# Patient Record
Sex: FEMALE | Race: WHITE | Employment: FULL TIME | URBAN - METROPOLITAN AREA
[De-identification: names, ages, dates, MRNs, and addresses within clinical notes are randomized per-mention and may not be internally consistent; named-entity substitution may affect disease eponyms.]

---

## 2017-01-23 ENCOUNTER — OFFICE VISIT (OUTPATIENT)
Dept: FAMILY MEDICINE CLINIC | Facility: CLINIC | Age: 37
End: 2017-01-23

## 2017-01-23 VITALS
SYSTOLIC BLOOD PRESSURE: 128 MMHG | HEIGHT: 59.5 IN | HEART RATE: 86 BPM | DIASTOLIC BLOOD PRESSURE: 68 MMHG | BODY MASS INDEX: 49.93 KG/M2 | RESPIRATION RATE: 16 BRPM | WEIGHT: 251 LBS

## 2017-01-23 DIAGNOSIS — M54.41 ACUTE RIGHT-SIDED LOW BACK PAIN WITH RIGHT-SIDED SCIATICA: Primary | ICD-10-CM

## 2017-01-23 PROCEDURE — 99214 OFFICE O/P EST MOD 30 MIN: CPT | Performed by: FAMILY MEDICINE

## 2017-01-23 RX ORDER — CYCLOBENZAPRINE HCL 10 MG
10 TABLET ORAL 3 TIMES DAILY
Qty: 30 TABLET | Refills: 1 | Status: SHIPPED | OUTPATIENT
Start: 2017-01-23 | End: 2017-02-12

## 2017-01-23 RX ORDER — NABUMETONE 750 MG/1
750 TABLET, FILM COATED ORAL 2 TIMES DAILY
Qty: 50 TABLET | Refills: 1 | Status: SHIPPED | OUTPATIENT
Start: 2017-01-23 | End: 2017-05-31

## 2017-01-23 NOTE — PROGRESS NOTES
Eduarda Shaw is a 40year old female here for Patient presents with:  Low Back Pain: started 3 days ago  Diarrhea: 01/19/17-01/20/2017  Nausea      HPI:     Back pain  -started 3-4 days ago  -no precipitating factor - although was having stomach bug with Pain  - with palpable areas of spasm  - no warning signs, no neuro deficits reassuring  -continue to monitor numbness in foot - if worsening and not improving let us know  -encouraged exercise, stretching (handouts given), warm/ice packs prn  -ibuprofen pr

## 2017-01-23 NOTE — PATIENT INSTRUCTIONS
-- start nabumetone 2x/day with food for 2-3 days, then as needed  -- cyclobenzaprine up to 3x/day as needed - may cause drowsiness  -- exercises, heat/ice as needed  --  Out of work for 2 days  -- followup in 2-4 wks depending on level of improvement

## 2017-05-31 PROBLEM — E66.01 MORBID OBESITY DUE TO EXCESS CALORIES (HCC): Status: ACTIVE | Noted: 2017-05-31

## 2017-05-31 PROBLEM — R06.09 DYSPNEA ON EXERTION: Status: ACTIVE | Noted: 2017-05-31

## 2017-05-31 PROBLEM — R94.31 ABNORMAL EKG: Status: ACTIVE | Noted: 2017-05-31

## 2017-05-31 PROBLEM — Z01.810 PRE-OPERATIVE CARDIOVASCULAR EXAMINATION: Status: ACTIVE | Noted: 2017-05-31

## 2017-05-31 PROBLEM — R06.00 DYSPNEA ON EXERTION: Status: ACTIVE | Noted: 2017-05-31

## 2017-06-21 ENCOUNTER — OFFICE VISIT (OUTPATIENT)
Dept: FAMILY MEDICINE CLINIC | Facility: CLINIC | Age: 37
End: 2017-06-21

## 2017-06-21 VITALS
DIASTOLIC BLOOD PRESSURE: 70 MMHG | HEIGHT: 59.5 IN | BODY MASS INDEX: 50.33 KG/M2 | SYSTOLIC BLOOD PRESSURE: 118 MMHG | HEART RATE: 70 BPM | RESPIRATION RATE: 14 BRPM | WEIGHT: 253 LBS

## 2017-06-21 DIAGNOSIS — R60.0 BILATERAL LEG EDEMA: Primary | ICD-10-CM

## 2017-06-21 PROCEDURE — 99214 OFFICE O/P EST MOD 30 MIN: CPT | Performed by: FAMILY MEDICINE

## 2017-06-21 NOTE — PATIENT INSTRUCTIONS
-- start otc compression stockings during the day (especially when at work), take off at night  -- limit sodium  -- increase activity - more walking or gentle exercise will help mobilize fluid  -- elevate legs when sitting at home  -- if not improving by Ariana Rolle

## 2017-06-21 NOTE — PROGRESS NOTES
Sylvie Moraels is a 29year old female here for Patient presents with:  Edema: bilateral leg & feet swelling on & off x 1 year       HPI:     Edema  -start about 1 yr ago  -off and on  -worse at end of day  -affected both legs and feet  -associated with di

## 2018-01-03 ENCOUNTER — HOSPITAL ENCOUNTER (OUTPATIENT)
Dept: GENERAL RADIOLOGY | Age: 38
Discharge: HOME | End: 2018-01-03
Attending: FAMILY MEDICINE
Payer: COMMERCIAL

## 2018-01-03 ENCOUNTER — OFFICE VISIT (OUTPATIENT)
Dept: FAMILY MEDICINE CLINIC | Facility: CLINIC | Age: 38
End: 2018-01-03

## 2018-01-03 ENCOUNTER — TELEPHONE (OUTPATIENT)
Dept: FAMILY MEDICINE CLINIC | Facility: CLINIC | Age: 38
End: 2018-01-03

## 2018-01-03 VITALS
DIASTOLIC BLOOD PRESSURE: 74 MMHG | TEMPERATURE: 98 F | HEIGHT: 59.5 IN | HEART RATE: 74 BPM | RESPIRATION RATE: 16 BRPM | WEIGHT: 194 LBS | BODY MASS INDEX: 38.59 KG/M2 | SYSTOLIC BLOOD PRESSURE: 118 MMHG

## 2018-01-03 DIAGNOSIS — J06.9 ACUTE URI: ICD-10-CM

## 2018-01-03 DIAGNOSIS — J40 BRONCHITIS: ICD-10-CM

## 2018-01-03 DIAGNOSIS — J06.9 ACUTE URI: Primary | ICD-10-CM

## 2018-01-03 PROCEDURE — 99213 OFFICE O/P EST LOW 20 MIN: CPT | Performed by: FAMILY MEDICINE

## 2018-01-03 PROCEDURE — 71046 X-RAY EXAM CHEST 2 VIEWS: CPT | Performed by: FAMILY MEDICINE

## 2018-01-03 RX ORDER — AZITHROMYCIN 500 MG/1
500 TABLET, FILM COATED ORAL DAILY
Qty: 7 TABLET | Refills: 0 | Status: SHIPPED | OUTPATIENT
Start: 2018-01-03 | End: 2018-01-10

## 2018-01-03 NOTE — TELEPHONE ENCOUNTER
Called and spoke with pt. Pt states she received results from  and has no further questions at this time.

## 2018-01-03 NOTE — PROGRESS NOTES
HPI:   Vic Ro is a 40year old female who presents for upper respiratory symptoms for  2  weeks.  Patient reports sore throat, congestion, clear and yellow colored nasal discharge, low grade fever, dry cough, chest pain from coughing, sinus pain, LAT CHEST ([id number]=[id number])  INDICATIONS:  J06.9 Acute upper respiratory infection, unspecified J40 Bronchitis, not specified as acute or chronic  COMPARISON:  None. TECHNIQUE:  PA and lateral chest radiographs were obtained.   PATIENT STATED HISTORY: (As transc

## 2018-01-03 NOTE — TELEPHONE ENCOUNTER
Called patient with X Ray results per Dr. Veto Cleaning normal anti-biotic called in.  No answer lvm

## 2018-03-07 ENCOUNTER — OFFICE VISIT (OUTPATIENT)
Dept: FAMILY MEDICINE CLINIC | Facility: CLINIC | Age: 38
End: 2018-03-07

## 2018-03-07 VITALS
BODY MASS INDEX: 33.82 KG/M2 | HEIGHT: 59.5 IN | SYSTOLIC BLOOD PRESSURE: 90 MMHG | HEART RATE: 66 BPM | DIASTOLIC BLOOD PRESSURE: 68 MMHG | WEIGHT: 170 LBS

## 2018-03-07 DIAGNOSIS — R55 SYNCOPE, UNSPECIFIED SYNCOPE TYPE: ICD-10-CM

## 2018-03-07 DIAGNOSIS — E16.2 HYPOGLYCEMIA: Primary | ICD-10-CM

## 2018-03-07 DIAGNOSIS — I95.89 OTHER SPECIFIED HYPOTENSION: ICD-10-CM

## 2018-03-07 PROCEDURE — 99496 TRANSJ CARE MGMT HIGH F2F 7D: CPT | Performed by: FAMILY MEDICINE

## 2018-03-07 PROCEDURE — 1111F DSCHRG MED/CURRENT MED MERGE: CPT | Performed by: FAMILY MEDICINE

## 2018-03-07 RX ORDER — BUTALBITAL, ACETAMINOPHEN AND CAFFEINE 50; 325; 40 MG/1; MG/1; MG/1
1 TABLET ORAL EVERY 4 HOURS PRN
Refills: 0 | COMMUNITY
Start: 2018-03-06 | End: 2021-02-22

## 2018-03-07 NOTE — PATIENT INSTRUCTIONS
-- more salt in diet  --ibuprofen 600mg-800mg up to 3x/day with meals as needed for headache (do not use for prolonged period)  -- limit use of prescription to more severe pain  -- check fasting sugar and 2 h after each meal  -- followup in 1 wk for rech

## 2018-03-07 NOTE — PROGRESS NOTES
HPI:    Ricky Siu is a 40year old female here today for hospital follow up.    She was discharged from Inpatient hospital, [facility] to Home   Admit Date: 3/2/18  Discharge Date: 3/6/18  Hospital Discharge Diagnosis:    Syncope, Hypoglycemia, Hypotensi medications on file prior to visit. HISTORY: reconciled and reviewed with patient  She  has no past medical history on file. She  has no past surgical history on file. She family history includes Diabetes in her maternal grandmother;  Other in h extremities  EXTREMITIES: no cyanosis, clubbing or edema  NEURO: Oriented times three, cranial nerves are intact, motor and sensory are grossly intact    ASSESSMENT/ PLAN:   Diagnoses and all orders for this visit:    Hypoglycemia  Syncope, unspecified syn days:   · Number of Possible Diagnoses and/or Management Options: severe  · Amount and/or Complexity of Data to Be Reviewed: severe  · Risk of Significant Complications, Morbidity, and/or Mortality: severe    Overall Risk:   severe    Patient seen within 7

## 2018-03-08 ENCOUNTER — MYAURORA ACCOUNT LINK (OUTPATIENT)
Dept: OTHER | Age: 38
End: 2018-03-08

## 2018-03-08 ENCOUNTER — PRIOR ORIGINAL RECORDS (OUTPATIENT)
Dept: OTHER | Age: 38
End: 2018-03-08

## 2018-03-09 ENCOUNTER — LAB ENCOUNTER (OUTPATIENT)
Dept: LAB | Age: 38
End: 2018-03-09
Attending: INTERNAL MEDICINE
Payer: COMMERCIAL

## 2018-03-09 ENCOUNTER — PRIOR ORIGINAL RECORDS (OUTPATIENT)
Dept: OTHER | Age: 38
End: 2018-03-09

## 2018-03-09 ENCOUNTER — HOSPITAL ENCOUNTER (OUTPATIENT)
Dept: CT IMAGING | Facility: HOSPITAL | Age: 38
Discharge: HOME | End: 2018-03-09
Attending: INTERNAL MEDICINE
Payer: COMMERCIAL

## 2018-03-09 DIAGNOSIS — R55 SYNCOPE: ICD-10-CM

## 2018-03-09 DIAGNOSIS — R07.2 CHEST PAIN, PRECORDIAL: ICD-10-CM

## 2018-03-09 DIAGNOSIS — Z01.818 PREOP TESTING: Primary | ICD-10-CM

## 2018-03-09 LAB
BUN BLD-MCNC: 12 MG/DL (ref 8–20)
CALCIUM BLD-MCNC: 8.9 MG/DL (ref 8.3–10.3)
CHLORIDE: 105 MMOL/L (ref 101–111)
CO2: 27 MMOL/L (ref 22–32)
CREAT BLD-MCNC: 0.6 MG/DL (ref 0.55–1.02)
GLUCOSE BLD-MCNC: 84 MG/DL (ref 70–99)
POTASSIUM SERPL-SCNC: 3.6 MMOL/L (ref 3.6–5.1)
SODIUM SERPL-SCNC: 141 MMOL/L (ref 136–144)

## 2018-03-09 PROCEDURE — 36415 COLL VENOUS BLD VENIPUNCTURE: CPT

## 2018-03-09 PROCEDURE — 75574 CT ANGIO HRT W/3D IMAGE: CPT | Performed by: INTERNAL MEDICINE

## 2018-03-09 PROCEDURE — 80048 BASIC METABOLIC PNL TOTAL CA: CPT

## 2018-03-09 RX ORDER — NITROGLYCERIN 0.4 MG/1
0.4 TABLET SUBLINGUAL ONCE
Status: COMPLETED | OUTPATIENT
Start: 2018-03-09 | End: 2018-03-09

## 2018-03-09 RX ORDER — NITROGLYCERIN 0.4 MG/1
TABLET SUBLINGUAL
Status: COMPLETED
Start: 2018-03-09 | End: 2018-03-09

## 2018-03-09 RX ADMIN — NITROGLYCERIN 0.4 MG: 0.4 TABLET SUBLINGUAL at 14:50:00

## 2018-03-13 ENCOUNTER — PRIOR ORIGINAL RECORDS (OUTPATIENT)
Dept: OTHER | Age: 38
End: 2018-03-13

## 2018-03-23 ENCOUNTER — HOSPITAL ENCOUNTER (OUTPATIENT)
Dept: CV DIAGNOSTICS | Age: 38
Discharge: HOME | End: 2018-03-23
Attending: INTERNAL MEDICINE
Payer: COMMERCIAL

## 2018-03-23 ENCOUNTER — MYAURORA ACCOUNT LINK (OUTPATIENT)
Dept: OTHER | Age: 38
End: 2018-03-23

## 2018-03-23 DIAGNOSIS — R55 SYNCOPE, UNSPECIFIED SYNCOPE TYPE: ICD-10-CM

## 2018-04-02 ENCOUNTER — PRIOR ORIGINAL RECORDS (OUTPATIENT)
Dept: OTHER | Age: 38
End: 2018-04-02

## 2018-06-10 ENCOUNTER — CHARTING TRANS (OUTPATIENT)
Dept: OTHER | Age: 38
End: 2018-06-10

## 2018-06-10 ENCOUNTER — LAB SERVICES (OUTPATIENT)
Dept: OTHER | Age: 38
End: 2018-06-10

## 2018-06-10 LAB — RAPID STREP GROUP A: NORMAL

## 2018-06-10 ASSESSMENT — PAIN SCALES - GENERAL: PAINLEVEL_OUTOF10: 8

## 2018-11-01 VITALS — HEART RATE: 72 BPM | RESPIRATION RATE: 16 BRPM | TEMPERATURE: 98 F | WEIGHT: 144.84 LBS

## 2019-02-28 VITALS
HEART RATE: 67 BPM | SYSTOLIC BLOOD PRESSURE: 96 MMHG | WEIGHT: 167 LBS | HEIGHT: 59 IN | DIASTOLIC BLOOD PRESSURE: 60 MMHG | BODY MASS INDEX: 33.67 KG/M2

## 2019-08-23 ENCOUNTER — TELEPHONE (OUTPATIENT)
Dept: FAMILY MEDICINE CLINIC | Facility: CLINIC | Age: 39
End: 2019-08-23

## 2019-08-23 NOTE — TELEPHONE ENCOUNTER
Patient called stating her BP is running high she took her BP and it is 180/98.  Patient would like to know if she should go to the ER or what she should do since Dr. Mayur Parrish has no openings

## 2019-08-23 NOTE — TELEPHONE ENCOUNTER
Phoned patient. She states for approx 3 days she has not felt well. C/O dizziness/faintness. States she has fainted approx year ago. Blood pressure at that time  was low. She checked it with this episode and now it is very high.   Advised patient to go

## 2019-09-04 NOTE — Clinical Note
Thank you for allowing me to see your patient. She can return to breast clinic PRN, I told her to continue screening mammograms through your office. Thanks!  Robby Lund Details (Free Text): 1 Photo Preface (Leave Blank If You Do Not Want): Photographs were obtained today Detail Level: Zone

## 2020-03-04 ENCOUNTER — PATIENT OUTREACH (OUTPATIENT)
Dept: FAMILY MEDICINE CLINIC | Facility: CLINIC | Age: 40
End: 2020-03-04

## 2020-03-04 NOTE — PROGRESS NOTES
Patient is due for Wellness Visit. Mail box is full unable to leave message for patient to call office. Patient needs appointment. Letter sent.

## 2020-05-05 ENCOUNTER — VIRTUAL PHONE E/M (OUTPATIENT)
Dept: FAMILY MEDICINE CLINIC | Facility: CLINIC | Age: 40
End: 2020-05-05
Payer: COMMERCIAL

## 2020-05-05 ENCOUNTER — LAB ENCOUNTER (OUTPATIENT)
Dept: LAB | Facility: HOSPITAL | Age: 40
End: 2020-05-05
Attending: FAMILY MEDICINE
Payer: COMMERCIAL

## 2020-05-05 DIAGNOSIS — R05.9 COUGH: ICD-10-CM

## 2020-05-05 DIAGNOSIS — Z20.822 CLOSE EXPOSURE TO COVID-19 VIRUS: Primary | ICD-10-CM

## 2020-05-05 DIAGNOSIS — R09.81 SINUS CONGESTION: ICD-10-CM

## 2020-05-05 DIAGNOSIS — R50.81 FEVER IN OTHER DISEASES: ICD-10-CM

## 2020-05-05 DIAGNOSIS — Z20.822 CLOSE EXPOSURE TO COVID-19 VIRUS: ICD-10-CM

## 2020-05-05 PROCEDURE — 99214 OFFICE O/P EST MOD 30 MIN: CPT | Performed by: FAMILY MEDICINE

## 2020-05-05 RX ORDER — ALBUTEROL SULFATE 90 UG/1
2 AEROSOL, METERED RESPIRATORY (INHALATION) EVERY 6 HOURS PRN
Qty: 1 INHALER | Refills: 0 | Status: SHIPPED | OUTPATIENT
Start: 2020-05-05 | End: 2020-05-05

## 2020-05-05 RX ORDER — ALBUTEROL SULFATE 90 UG/1
AEROSOL, METERED RESPIRATORY (INHALATION)
Qty: 25.5 G | Refills: 0 | Status: SHIPPED | OUTPATIENT
Start: 2020-05-05 | End: 2021-02-22

## 2020-05-05 RX ORDER — BENZONATATE 200 MG/1
200 CAPSULE ORAL 3 TIMES DAILY PRN
Qty: 20 CAPSULE | Refills: 0 | Status: SHIPPED | OUTPATIENT
Start: 2020-05-05 | End: 2020-05-11

## 2020-05-05 RX ORDER — GUAIFENESIN AND CODEINE PHOSPHATE 100; 10 MG/5ML; MG/5ML
5 SOLUTION ORAL 3 TIMES DAILY PRN
Qty: 118 ML | Refills: 0 | Status: SHIPPED | OUTPATIENT
Start: 2020-05-05 | End: 2020-05-11

## 2020-05-05 NOTE — PROGRESS NOTES
Virtual/Telephone Check-In    Lauren AMAYA Harris Garcia is a 46year old female here today for a telemedicine/video visit. Patient understands and accepts financial responsibility for any deductible, co-insurance and/or co-pays associated with this service.     D reviewed. No pertinent past medical history. History reviewed. No pertinent surgical history.    Family History   Problem Relation Age of Onset   • Diabetes Maternal Grandmother    • Other (Other [Other]) Paternal Grandfather         Liver disease      So adequate time. This billing was spent on reviewing labs, medications, radiology tests and decision making. Appropriate medical decision-making and tests are ordered as detailed in the plan of care above.

## 2020-05-05 NOTE — PATIENT INSTRUCTIONS
Tessalon as needed for cough    Codeine syrup as needed    Albuterol inhaler as needed    We will call tomorrow for followup

## 2020-05-06 ENCOUNTER — VIRTUAL PHONE E/M (OUTPATIENT)
Dept: FAMILY MEDICINE CLINIC | Facility: CLINIC | Age: 40
End: 2020-05-06
Payer: COMMERCIAL

## 2020-05-06 DIAGNOSIS — U07.1 COVID-19: Primary | ICD-10-CM

## 2020-05-06 DIAGNOSIS — R05.9 COUGH: ICD-10-CM

## 2020-05-06 DIAGNOSIS — R50.9 FEVER, UNSPECIFIED FEVER CAUSE: ICD-10-CM

## 2020-05-06 PROCEDURE — 99214 OFFICE O/P EST MOD 30 MIN: CPT | Performed by: FAMILY MEDICINE

## 2020-05-06 NOTE — PROGRESS NOTES
Virtual/Telephone Check-In    Lauren AMAYA Moriah Cabello is a 46year old female here today for a telemedicine/video visit. Patient understands and accepts financial responsibility for any deductible, co-insurance and/or co-pays associated with this service.     D Outpatient Medications   Medication Sig Dispense Refill   • benzonatate 200 MG Oral Cap Take 1 capsule (200 mg total) by mouth 3 (three) times daily as needed for cough.  20 capsule 0   • guaiFENesin-Codeine 100-10 MG/5ML Oral Syrup Take 5 mL by mouth 3 (th

## 2020-05-07 ENCOUNTER — PATIENT OUTREACH (OUTPATIENT)
Dept: CASE MANAGEMENT | Age: 40
End: 2020-05-07

## 2020-05-07 NOTE — PROGRESS NOTES
Home Monitoring Condition Update    Covid19+ test date: 5/5/2020      Consent Verification:  Assessment Completed With: Patient  HIPAA Verified?   Yes    COVID-19 HOME MONITORING 5/7/2020   Temperature 99.9   Reading From Mouth   Pulse 68   Pulse taken fr 911.      Time spent this encounter reviewing chart, speaking with patient, gathering resources  Total Time: 15  minutes

## 2020-05-08 ENCOUNTER — VIRTUAL PHONE E/M (OUTPATIENT)
Dept: FAMILY MEDICINE CLINIC | Facility: CLINIC | Age: 40
End: 2020-05-08
Payer: COMMERCIAL

## 2020-05-08 ENCOUNTER — PATIENT OUTREACH (OUTPATIENT)
Dept: CASE MANAGEMENT | Age: 40
End: 2020-05-08

## 2020-05-08 ENCOUNTER — TELEPHONE (OUTPATIENT)
Dept: CASE MANAGEMENT | Age: 40
End: 2020-05-08

## 2020-05-08 ENCOUNTER — HOSPITAL ENCOUNTER (EMERGENCY)
Facility: HOSPITAL | Age: 40
Discharge: HOME | End: 2020-05-08
Attending: EMERGENCY MEDICINE
Payer: COMMERCIAL

## 2020-05-08 ENCOUNTER — APPOINTMENT (OUTPATIENT)
Dept: GENERAL RADIOLOGY | Facility: HOSPITAL | Age: 40
End: 2020-05-08
Attending: EMERGENCY MEDICINE
Payer: COMMERCIAL

## 2020-05-08 VITALS
SYSTOLIC BLOOD PRESSURE: 110 MMHG | WEIGHT: 120 LBS | DIASTOLIC BLOOD PRESSURE: 72 MMHG | TEMPERATURE: 98 F | BODY MASS INDEX: 24.19 KG/M2 | HEIGHT: 59 IN | RESPIRATION RATE: 13 BRPM | HEART RATE: 73 BPM | OXYGEN SATURATION: 99 %

## 2020-05-08 DIAGNOSIS — U07.1 COVID-19: Primary | ICD-10-CM

## 2020-05-08 PROCEDURE — 99453 REM MNTR PHYSIOL PARAM SETUP: CPT

## 2020-05-08 PROCEDURE — 85379 FIBRIN DEGRADATION QUANT: CPT | Performed by: PHYSICIAN ASSISTANT

## 2020-05-08 PROCEDURE — 85025 COMPLETE CBC W/AUTO DIFF WBC: CPT | Performed by: EMERGENCY MEDICINE

## 2020-05-08 PROCEDURE — 99285 EMERGENCY DEPT VISIT HI MDM: CPT

## 2020-05-08 PROCEDURE — 99284 EMERGENCY DEPT VISIT MOD MDM: CPT

## 2020-05-08 PROCEDURE — 71045 X-RAY EXAM CHEST 1 VIEW: CPT | Performed by: EMERGENCY MEDICINE

## 2020-05-08 PROCEDURE — 93005 ELECTROCARDIOGRAM TRACING: CPT

## 2020-05-08 PROCEDURE — 93010 ELECTROCARDIOGRAM REPORT: CPT

## 2020-05-08 PROCEDURE — 80053 COMPREHEN METABOLIC PANEL: CPT | Performed by: EMERGENCY MEDICINE

## 2020-05-08 PROCEDURE — 84484 ASSAY OF TROPONIN QUANT: CPT | Performed by: EMERGENCY MEDICINE

## 2020-05-08 RX ORDER — ACETAMINOPHEN 500 MG
1000 TABLET ORAL EVERY 4 HOURS PRN
COMMUNITY
End: 2021-07-08 | Stop reason: ALTCHOICE

## 2020-05-08 NOTE — PROGRESS NOTES
Patient called - no answer - reviewed notes - patient symptoms worse - sent to ER - will monitor closely

## 2020-05-08 NOTE — TELEPHONE ENCOUNTER
Called patient for Day 2 COVID monitoring  Patient feeling worse today. T: 98.4 orally with last dose of tylenol at 0900 this morning  P: 80 manually  Patient coughing - persistent, dry unproductive.    Patient does have some SOB during coughing fit as we

## 2020-05-08 NOTE — CM/SW NOTE
IS device orientation completed the below steps were then reviewed. Step 1. Exhale normally. Then, inhale normally. Relax and breathe out. Step 2. Place your lips tightly around the mouthpiece. Make sure the device is upright and not tilted.   Si

## 2020-05-08 NOTE — PROGRESS NOTES
Home Monitoring Condition Update    Covid19+ test date: 5/5/20  Contact date: 5/8/20      Consent Verification:  Assessment Completed With: Patient  HIPAA Verified?   Yes    COVID-19 HOME MONITORING 5/8/2020   Temperature 98.4   Reading From Mouth   Pulse shoulder/Jaw. Denies current dizziness. Patient had a dose of tylenol this morning at 0900. Message sent to pcp to address. Patient advised to check temperature and pulse twice daily and record.  Patient also informed we will call tomorrow to get a conditi

## 2020-05-08 NOTE — ED PROVIDER NOTES
Patient Seen in: [facility] Emergency Department      History   Patient presents with:  Chest Pain Angina    Stated Complaint:     HPI    44-year-old female who comes in today complaining of worsening chest pain that is sharp and shortness of breath or tonsillar hypertrophy, uvula midline, no trismus or drooling   Neck: Supple; no anterior or posterior cervical adenopathy  Lungs: Clear to auscultation bilaterally, respirations unlabored. No wheezing, rales or rhonchi. Heart: NSR, S1, S2 present.  No on 5/08/2020 at 1:48 PM     Finalized by: Paco Hodge MD on 5/08/2020 at 1:48 PM                MDM     Discussed with and evaluated the patient with Dr. Elie Cuevas who agrees with assessment and plan.     Since chest x-ray is negative, laboratory work patient verbalized understanding of the discharge instructions and plan.

## 2020-05-09 ENCOUNTER — PATIENT OUTREACH (OUTPATIENT)
Dept: CASE MANAGEMENT | Age: 40
End: 2020-05-09

## 2020-05-09 NOTE — PROGRESS NOTES
Home Monitoring Condition Update    Covid19+ test date: 5/5/20  Contact date: 5/9/20      Consent Verification:  Assessment Completed With: Patient  HIPAA Verified?   Yes    COVID-19 HOME MONITORING 5/9/2020   Temperature 98.8   Reading From Mouth   SPO2 to exceed 3 grams per day. Patient verbalizes understanding. Patient went to ER yesterday for SOB, cough. Patient feeling much better today. No coughing while on call.  Patient was given a pulse oximeter and using a nebulizer machine PRN for cough, SOB, wh

## 2020-05-11 ENCOUNTER — VIRTUAL PHONE E/M (OUTPATIENT)
Dept: FAMILY MEDICINE CLINIC | Facility: CLINIC | Age: 40
End: 2020-05-11
Payer: COMMERCIAL

## 2020-05-11 ENCOUNTER — PATIENT OUTREACH (OUTPATIENT)
Dept: CASE MANAGEMENT | Age: 40
End: 2020-05-11

## 2020-05-11 DIAGNOSIS — R05.9 COUGH: ICD-10-CM

## 2020-05-11 DIAGNOSIS — R50.9 FEVER, UNSPECIFIED FEVER CAUSE: ICD-10-CM

## 2020-05-11 DIAGNOSIS — U07.1 COVID-19: Primary | ICD-10-CM

## 2020-05-11 PROCEDURE — 99214 OFFICE O/P EST MOD 30 MIN: CPT | Performed by: FAMILY MEDICINE

## 2020-05-11 RX ORDER — GUAIFENESIN AND CODEINE PHOSPHATE 100; 10 MG/5ML; MG/5ML
SOLUTION ORAL 3 TIMES DAILY PRN
Qty: 118 ML | Refills: 0 | Status: SHIPPED | OUTPATIENT
Start: 2020-05-11 | End: 2020-05-19

## 2020-05-11 RX ORDER — BENZONATATE 200 MG/1
200 CAPSULE ORAL 3 TIMES DAILY PRN
Qty: 20 CAPSULE | Refills: 0 | Status: SHIPPED | OUTPATIENT
Start: 2020-05-11 | End: 2020-05-19

## 2020-05-11 NOTE — PROGRESS NOTES
Home Monitoring Condition Update    Covid19+ test date: 5/5/2020      Consent Verification:  Assessment Completed With: Patient  HIPAA Verified?   Yes    COVID-19 HOME MONITORING 5/11/2020   Temperature 98.9   Reading From Mouth   SPO2 96   Pulse 64   Pul call for update again tomorrow. Patient voices understanding/agrees with plan/no further questions.      The patient was directed to continue to isolate away from other household members when possible and stay completely isolated from the general public 3 d

## 2020-05-11 NOTE — PROGRESS NOTES
Virtual/Telephone Check-In    Lauren AMAYA Shlomo Choi is a 46year old female here today for a telemedicine/video visit. Patient understands and accepts financial responsibility for any deductible, co-insurance and/or co-pays associated with this service.     D Family History   Problem Relation Age of Onset   • Diabetes Maternal Grandmother    • Other (Other [Other]) Paternal Grandfather         Liver disease      Social History: Social History    Tobacco Use      Smoking status: Never Smoker      Smokeless tob performed. Every conscious effort was taken to allow for sufficient and adequate time. This billing was spent on reviewing labs, medications, radiology tests and decision making.   Appropriate medical decision-making and tests are ordered as detailed in t

## 2020-05-12 ENCOUNTER — PATIENT OUTREACH (OUTPATIENT)
Dept: CASE MANAGEMENT | Age: 40
End: 2020-05-12

## 2020-05-12 NOTE — PROGRESS NOTES
Home Monitoring Condition Update    Covid19+ test date:   5/5/2020      Consent Verification:  Assessment Completed With: Patient  HIPAA Verified?   Yes    COVID-19 HOME MONITORING 5/12/2020   Temperature 98.3   Reading From Mouth   SPO2 92   Pulse 72   P contacting him/her tomorrow. She has been using her spirometer and we discussed cleaning the mouth piece.       The patient was directed to continue to isolate away from other household members when possible and stay completely isolated from the general p

## 2020-05-13 ENCOUNTER — VIRTUAL PHONE E/M (OUTPATIENT)
Dept: FAMILY MEDICINE CLINIC | Facility: CLINIC | Age: 40
End: 2020-05-13
Payer: COMMERCIAL

## 2020-05-13 ENCOUNTER — PATIENT OUTREACH (OUTPATIENT)
Dept: CASE MANAGEMENT | Age: 40
End: 2020-05-13

## 2020-05-13 DIAGNOSIS — U07.1 COVID-19: Primary | ICD-10-CM

## 2020-05-13 DIAGNOSIS — R50.9 FEVER, UNSPECIFIED FEVER CAUSE: ICD-10-CM

## 2020-05-13 DIAGNOSIS — R05.9 COUGH: ICD-10-CM

## 2020-05-13 PROCEDURE — 99213 OFFICE O/P EST LOW 20 MIN: CPT | Performed by: FAMILY MEDICINE

## 2020-05-13 NOTE — PROGRESS NOTES
Virtual/Telephone Check-In    Lauren AMAYA Benuel Crigler is a 46year old female here today for a telemedicine/video visit. Patient understands and accepts financial responsibility for any deductible, co-insurance and/or co-pays associated with this service.     D use: No      Alcohol/week: 0.0 standard drinks    Drug use: No         Medications (Active prior to today's visit):  Current Outpatient Medications   Medication Sig Dispense Refill   • benzonatate 200 MG Oral Cap Take 1 capsule (200 mg total) by mouth 3 (t

## 2020-05-13 NOTE — PROGRESS NOTES
Home Monitoring Condition Update    Covid19+ test date: 5/5/2020      Consent Verification:  Assessment Completed With: Patient  HIPAA Verified?   Yes    COVID-19 HOME MONITORING 5/13/2020   Temperature 98   Reading From -   SPO2 98   Pulse 62   Pulse charity tomorrow. Patient voices understanding/agrees with plan/no further questions.      The patient was directed to continue to isolate away from other household members when possible and stay completely isolated from the general public 3 days after symptoms res

## 2020-05-14 ENCOUNTER — PATIENT OUTREACH (OUTPATIENT)
Dept: CASE MANAGEMENT | Age: 40
End: 2020-05-14

## 2020-05-14 NOTE — PROGRESS NOTES
LMOMTCB for home monitoring day 7 condition update. NCM contact information provided. - per VV yesterday PCP would like us to continue home monitoring.

## 2020-05-15 ENCOUNTER — TELEPHONE (OUTPATIENT)
Dept: CASE MANAGEMENT | Age: 40
End: 2020-05-15

## 2020-05-15 NOTE — TELEPHONE ENCOUNTER
Called patient for day 7 condition update    Left calf pain started last night. Denies warmth, redness, and swelling where pain is located. Denies SOB. Patient denies injury to the area. Describes pain as \"arnoldo horse\" but pain does not resolve.   T: 99

## 2020-05-15 NOTE — TELEPHONE ENCOUNTER
Would have her monitor closely, tylenol prn    If severe worsening of pain develops over weekend, or gets more swollen, red, and warm - would have her go to ER for further evaluation (as mentioned)    Otherwise, she can call office next week if not [place]

## 2020-05-15 NOTE — TELEPHONE ENCOUNTER
Patient advised and verbalized understanding. Patient denies swelling, denies redness, denies warmth to area.

## 2020-05-15 NOTE — PROGRESS NOTES
Home Monitoring Condition Update    Covid19+ test date: 5/5/20  Contact date: 5/15/20      Consent Verification:  Assessment Completed With: Patient  HIPAA Verified?   Yes    COVID-19 HOME MONITORING 5/15/2020   Temperature 99.6   Reading From Mouth   SPO Patient denies injury to the area. Describes pain as \"arnoldo horse\" but pain did not resolve. Patient advised go to ER for eval if any of the above symptoms develop. TE sent to PCP to notify and see if intervention needed.   Patient advised to inform the

## 2020-05-16 ENCOUNTER — PATIENT OUTREACH (OUTPATIENT)
Dept: CASE MANAGEMENT | Age: 40
End: 2020-05-16

## 2020-05-18 ENCOUNTER — PATIENT OUTREACH (OUTPATIENT)
Dept: CASE MANAGEMENT | Age: 40
End: 2020-05-18

## 2020-05-18 DIAGNOSIS — R05.9 COUGH: ICD-10-CM

## 2020-05-18 DIAGNOSIS — Z20.822 CLOSE EXPOSURE TO COVID-19 VIRUS: ICD-10-CM

## 2020-05-18 RX ORDER — BENZONATATE 200 MG/1
200 CAPSULE ORAL 3 TIMES DAILY PRN
Qty: 20 CAPSULE | Refills: 0 | OUTPATIENT
Start: 2020-05-18

## 2020-05-18 RX ORDER — GUAIFENESIN AND CODEINE PHOSPHATE 100; 10 MG/5ML; MG/5ML
SOLUTION ORAL 3 TIMES DAILY PRN
Qty: 118 ML | Refills: 0 | OUTPATIENT
Start: 2020-05-18 | End: 2020-06-01

## 2020-05-18 RX ORDER — ALBUTEROL SULFATE 90 UG/1
2 AEROSOL, METERED RESPIRATORY (INHALATION) EVERY 6 HOURS PRN
Qty: 25.5 G | Refills: 0 | OUTPATIENT
Start: 2020-05-18

## 2020-05-18 NOTE — TELEPHONE ENCOUNTER
Refills denied as they were for acute therapy.  Patient to follow up if still having concerning symptoms that need treatment

## 2020-05-18 NOTE — PROGRESS NOTES
Home Monitoring Condition Update    Covid19+ test date: 5/5/20  Contact date: 5/18/20      Consent Verification:  Assessment Completed With: Patient  HIPAA Verified?   Yes    COVID-19 HOME MONITORING 5/18/2020   Temperature 98.3   Reading From Mouth   SPO with symptoms. Patient states she was suppose to return to work Wednesday but does feel like she can because of the cough. Follow up visit scheduled with patient's PCP. Patient also has complaints of constant headache.  Feels better after taking tylenol and

## 2020-05-19 ENCOUNTER — VIRTUAL PHONE E/M (OUTPATIENT)
Dept: FAMILY MEDICINE CLINIC | Facility: CLINIC | Age: 40
End: 2020-05-19
Payer: COMMERCIAL

## 2020-05-19 DIAGNOSIS — U07.1 COVID-19: Primary | ICD-10-CM

## 2020-05-19 DIAGNOSIS — R50.9 FEVER, UNSPECIFIED FEVER CAUSE: ICD-10-CM

## 2020-05-19 DIAGNOSIS — R05.9 COUGH: ICD-10-CM

## 2020-05-19 PROCEDURE — 99213 OFFICE O/P EST LOW 20 MIN: CPT | Performed by: FAMILY MEDICINE

## 2020-05-19 RX ORDER — GUAIFENESIN AND CODEINE PHOSPHATE 100; 10 MG/5ML; MG/5ML
SOLUTION ORAL 3 TIMES DAILY PRN
Qty: 118 ML | Refills: 0 | Status: SHIPPED | OUTPATIENT
Start: 2020-05-19 | End: 2020-06-02

## 2020-05-19 RX ORDER — BENZONATATE 200 MG/1
200 CAPSULE ORAL 3 TIMES DAILY PRN
Qty: 20 CAPSULE | Refills: 0 | Status: SHIPPED | OUTPATIENT
Start: 2020-05-19 | End: 2021-02-22

## 2020-05-19 NOTE — PROGRESS NOTES
Virtual/Telephone Check-In    Lauren AMAYA Stanford Cain is a 46year old female here today for a telemedicine/video visit. Patient understands and accepts financial responsibility for any deductible, co-insurance and/or co-pays associated with this service.     D History: Social History    Tobacco Use      Smoking status: Never Smoker      Smokeless tobacco: Never Used    Alcohol use: No      Alcohol/week: 0.0 standard drinks    Drug use: No         Medications (Active prior to today's visit):  Current Outpatient M decision making. Appropriate medical decision-making and tests are ordered as detailed in the plan of care above.

## 2020-05-20 ENCOUNTER — TELEPHONE (OUTPATIENT)
Dept: CASE MANAGEMENT | Age: 40
End: 2020-05-20

## 2020-05-20 ENCOUNTER — PATIENT OUTREACH (OUTPATIENT)
Dept: CASE MANAGEMENT | Age: 40
End: 2020-05-20

## 2020-05-20 NOTE — TELEPHONE ENCOUNTER
Patient had COVID follow up visit 5/19  Please advise if you would like to continue [facility] home monitoring program or okay to discharge. Please respond to [address] monitoring pool.     Thank you

## 2020-05-20 NOTE — PROGRESS NOTES
Patient had VV on 5/19.   Per pcp okay to stop home monitoring program:    Shukri MirandasicianSigned  2:14 PM                Ok to discontinue - thanks for your help

## 2020-05-26 ENCOUNTER — PATIENT MESSAGE (OUTPATIENT)
Dept: FAMILY MEDICINE CLINIC | Facility: CLINIC | Age: 40
End: 2020-05-26

## 2020-05-26 NOTE — TELEPHONE ENCOUNTER
From: Gomez Thayer  To: Tierra Lo MD  Sent: 5/26/2020 11:13 AM CDT  Subject: Other    Good Morning Dr. Jeffrey Simon     I was wondering if you would be able to provide a note to go back to the office, I am feeling better and no longer have the cough.

## 2020-07-02 ENCOUNTER — CLINICAL ABSTRACT (OUTPATIENT)
Dept: OBGYN | Age: 40
End: 2020-07-02

## 2020-07-03 ENCOUNTER — OFFICE VISIT (OUTPATIENT)
Dept: OBGYN | Age: 40
End: 2020-07-03

## 2020-07-03 ENCOUNTER — LAB SERVICES (OUTPATIENT)
Dept: LAB | Age: 40
End: 2020-07-03

## 2020-07-03 VITALS
SYSTOLIC BLOOD PRESSURE: 107 MMHG | WEIGHT: 135.36 LBS | BODY MASS INDEX: 27.29 KG/M2 | RESPIRATION RATE: 20 BRPM | DIASTOLIC BLOOD PRESSURE: 72 MMHG | HEART RATE: 68 BPM | HEIGHT: 59 IN | TEMPERATURE: 97.4 F

## 2020-07-03 DIAGNOSIS — N89.8 VAGINAL DISCHARGE: ICD-10-CM

## 2020-07-03 DIAGNOSIS — Z11.51 SCREENING FOR HPV (HUMAN PAPILLOMAVIRUS): ICD-10-CM

## 2020-07-03 DIAGNOSIS — N92.6 MENSTRUAL CHANGES: ICD-10-CM

## 2020-07-03 DIAGNOSIS — Z12.4 SCREENING FOR MALIGNANT NEOPLASM OF CERVIX: ICD-10-CM

## 2020-07-03 DIAGNOSIS — Z71.85 HPV VACCINE COUNSELING: ICD-10-CM

## 2020-07-03 DIAGNOSIS — Z01.419 WOMEN'S ANNUAL ROUTINE GYNECOLOGICAL EXAMINATION: Primary | ICD-10-CM

## 2020-07-03 DIAGNOSIS — Z30.431 IUD CHECK UP: ICD-10-CM

## 2020-07-03 LAB — TSH SERPL-ACNC: 2.32 MCUNITS/ML (ref 0.35–5)

## 2020-07-03 PROCEDURE — 36415 COLL VENOUS BLD VENIPUNCTURE: CPT | Performed by: NURSE PRACTITIONER

## 2020-07-03 PROCEDURE — 84443 ASSAY THYROID STIM HORMONE: CPT | Performed by: NURSE PRACTITIONER

## 2020-07-03 PROCEDURE — 87591 N.GONORRHOEAE DNA AMP PROB: CPT | Performed by: NURSE PRACTITIONER

## 2020-07-03 PROCEDURE — 99202 OFFICE O/P NEW SF 15 MIN: CPT | Performed by: NURSE PRACTITIONER

## 2020-07-03 PROCEDURE — 87480 CANDIDA DNA DIR PROBE: CPT | Performed by: NURSE PRACTITIONER

## 2020-07-03 PROCEDURE — 87491 CHLMYD TRACH DNA AMP PROBE: CPT | Performed by: NURSE PRACTITIONER

## 2020-07-03 PROCEDURE — 87510 GARDNER VAG DNA DIR PROBE: CPT | Performed by: NURSE PRACTITIONER

## 2020-07-03 PROCEDURE — 87660 TRICHOMONAS VAGIN DIR PROBE: CPT | Performed by: NURSE PRACTITIONER

## 2020-07-03 PROCEDURE — 99385 PREV VISIT NEW AGE 18-39: CPT | Performed by: NURSE PRACTITIONER

## 2020-07-03 SDOH — HEALTH STABILITY: MENTAL HEALTH: HOW OFTEN DO YOU HAVE 6 OR MORE DRINKS ON ONE OCCASION?: NEVER

## 2020-07-03 SDOH — SOCIAL STABILITY: SOCIAL NETWORK: ARE YOU MARRIED, WIDOWED, DIVORCED, SEPARATED, NEVER MARRIED, OR LIVING WITH A PARTNER?: PATIENT DECLINED

## 2020-07-03 SDOH — SOCIAL STABILITY: SOCIAL INSECURITY: WITHIN THE LAST YEAR, HAVE YOU BEEN AFRAID OF YOUR PARTNER OR EX-PARTNER?: NO

## 2020-07-03 SDOH — SOCIAL STABILITY: SOCIAL NETWORK: HOW OFTEN DO YOU ATTEND CHURCH OR RELIGIOUS SERVICES?: PATIENT DECLINED

## 2020-07-03 SDOH — HEALTH STABILITY: PHYSICAL HEALTH: ON AVERAGE, HOW MANY DAYS PER WEEK DO YOU ENGAGE IN MODERATE TO STRENUOUS EXERCISE (LIKE A BRISK WALK)?: 3 DAYS

## 2020-07-03 SDOH — HEALTH STABILITY: PHYSICAL HEALTH: ON AVERAGE, HOW MANY MINUTES DO YOU ENGAGE IN EXERCISE AT THIS LEVEL?: 60 MIN

## 2020-07-03 SDOH — HEALTH STABILITY: MENTAL HEALTH
STRESS IS WHEN SOMEONE FEELS TENSE, NERVOUS, ANXIOUS, OR CAN'T SLEEP AT NIGHT BECAUSE THEIR MIND IS TROUBLED. HOW STRESSED ARE YOU?: TO SOME EXTENT

## 2020-07-03 SDOH — ECONOMIC STABILITY: TRANSPORTATION INSECURITY
IN THE PAST 12 MONTHS, HAS LACK OF TRANSPORTATION KEPT YOU FROM MEETINGS, WORK, OR FROM GETTING THINGS NEEDED FOR DAILY LIVING?: NO

## 2020-07-03 SDOH — ECONOMIC STABILITY: TRANSPORTATION INSECURITY
IN THE PAST 12 MONTHS, HAS THE LACK OF TRANSPORTATION KEPT YOU FROM MEDICAL APPOINTMENTS OR FROM GETTING MEDICATIONS?: NO

## 2020-07-03 SDOH — SOCIAL STABILITY: SOCIAL INSECURITY
WITHIN THE LAST YEAR, HAVE YOU BEEN KICKED, HIT, SLAPPED, OR OTHERWISE PHYSICALLY HURT BY YOUR PARTNER OR EX-PARTNER?: NO

## 2020-07-03 SDOH — SOCIAL STABILITY: SOCIAL NETWORK: HOW OFTEN DO YOU GET TOGETHER WITH FRIENDS OR RELATIVES?: PATIENT DECLINED

## 2020-07-03 SDOH — SOCIAL STABILITY: SOCIAL NETWORK
DO YOU BELONG TO ANY CLUBS OR ORGANIZATIONS SUCH AS CHURCH GROUPS UNIONS, FRATERNAL OR ATHLETIC GROUPS, OR SCHOOL GROUPS?: PATIENT DECLINED

## 2020-07-03 SDOH — SOCIAL STABILITY: SOCIAL NETWORK: HOW OFTEN DO YOU ATTENT MEETINGS OF THE CLUB OR ORGANIZATION YOU BELONG TO?: PATIENT DECLINED

## 2020-07-03 SDOH — HEALTH STABILITY: MENTAL HEALTH: HOW MANY STANDARD DRINKS CONTAINING ALCOHOL DO YOU HAVE ON A TYPICAL DAY?: 1 OR 2

## 2020-07-03 SDOH — ECONOMIC STABILITY: INCOME INSECURITY: HOW HARD IS IT FOR YOU TO PAY FOR THE VERY BASICS LIKE FOOD, HOUSING, MEDICAL CARE, AND HEATING?: NOT HARD AT ALL

## 2020-07-03 SDOH — ECONOMIC STABILITY: FOOD INSECURITY: WITHIN THE PAST 12 MONTHS, THE FOOD YOU BOUGHT JUST DIDN'T LAST AND YOU DIDN'T HAVE MONEY TO GET MORE.: NEVER TRUE

## 2020-07-03 SDOH — SOCIAL STABILITY: SOCIAL INSECURITY: WITHIN THE LAST YEAR, HAVE YOU BEEN HUMILIATED OR EMOTIONALLY ABUSED IN OTHER WAYS BY YOUR PARTNER OR EX-PARTNER?: NO

## 2020-07-03 SDOH — SOCIAL STABILITY: SOCIAL NETWORK: IN A TYPICAL WEEK, HOW MANY TIMES DO YOU TALK ON THE PHONE WITH FAMILY, FRIENDS, OR NEIGHBORS?: PATIENT DECLINED

## 2020-07-03 SDOH — SOCIAL STABILITY: SOCIAL INSECURITY
WITHIN THE LAST YEAR, HAVE TO BEEN RAPED OR FORCED TO HAVE ANY KIND OF SEXUAL ACTIVITY BY YOUR PARTNER OR EX-PARTNER?: NO

## 2020-07-03 SDOH — HEALTH STABILITY: MENTAL HEALTH: HOW OFTEN DO YOU HAVE A DRINK CONTAINING ALCOHOL?: MONTHLY OR LESS

## 2020-07-03 SDOH — ECONOMIC STABILITY: FOOD INSECURITY: WITHIN THE PAST 12 MONTHS, YOU WORRIED THAT YOUR FOOD WOULD RUN OUT BEFORE YOU GOT MONEY TO BUY MORE.: NEVER TRUE

## 2020-07-03 ASSESSMENT — PATIENT HEALTH QUESTIONNAIRE - PHQ9
SUM OF ALL RESPONSES TO PHQ9 QUESTIONS 1 AND 2: 0
SUM OF ALL RESPONSES TO PHQ9 QUESTIONS 1 AND 2: 0
2. FEELING DOWN, DEPRESSED OR HOPELESS: NOT AT ALL
CLINICAL INTERPRETATION OF PHQ2 SCORE: NO FURTHER SCREENING NEEDED
CLINICAL INTERPRETATION OF PHQ9 SCORE: NO FURTHER SCREENING NEEDED
1. LITTLE INTEREST OR PLEASURE IN DOING THINGS: NOT AT ALL

## 2020-07-04 LAB
C TRACH RRNA SPEC QL NAA+PROBE: NEGATIVE
N GONORRHOEA RRNA SPEC QL NAA+PROBE: NEGATIVE
SPECIMEN SOURCE: NORMAL

## 2020-07-05 LAB
CANDIDA RRNA VAG QL PROBE: NEGATIVE
G VAGINALIS RRNA GENITAL QL PROBE: POSITIVE
T VAGINALIS RRNA GENITAL QL PROBE: NEGATIVE

## 2020-07-07 DIAGNOSIS — N76.0 BV (BACTERIAL VAGINOSIS): Primary | ICD-10-CM

## 2020-07-07 DIAGNOSIS — B96.89 BV (BACTERIAL VAGINOSIS): Primary | ICD-10-CM

## 2020-07-07 RX ORDER — METRONIDAZOLE 500 MG/1
500 TABLET ORAL 2 TIMES DAILY
Qty: 14 TABLET | Refills: 0 | Status: SHIPPED | OUTPATIENT
Start: 2020-07-07 | End: 2020-07-14

## 2020-07-07 RX ORDER — FLUCONAZOLE 150 MG/1
150 TABLET ORAL ONCE
Qty: 1 TABLET | Refills: 0 | Status: SHIPPED | OUTPATIENT
Start: 2020-07-07 | End: 2020-07-07

## 2020-07-16 LAB — HPV16+18+45 E6+E7MRNA CVX NAA+PROBE: NORMAL

## 2020-09-30 ENCOUNTER — TELEPHONE (OUTPATIENT)
Dept: OBGYN | Age: 40
End: 2020-09-30

## 2020-11-03 ENCOUNTER — OFFICE VISIT (OUTPATIENT)
Dept: OBGYN | Age: 40
End: 2020-11-03

## 2020-11-03 VITALS
BODY MASS INDEX: 28.3 KG/M2 | HEART RATE: 69 BPM | WEIGHT: 140.1 LBS | DIASTOLIC BLOOD PRESSURE: 71 MMHG | TEMPERATURE: 98 F | RESPIRATION RATE: 16 BRPM | SYSTOLIC BLOOD PRESSURE: 114 MMHG

## 2020-11-03 DIAGNOSIS — R87.615 UNSATISFACTORY CYTOLOGY OF CERVICAL PAPANICOLAOU SMEAR: Primary | ICD-10-CM

## 2020-11-03 PROCEDURE — 99212 OFFICE O/P EST SF 10 MIN: CPT | Performed by: NURSE PRACTITIONER

## 2020-11-03 SDOH — HEALTH STABILITY: MENTAL HEALTH: HOW OFTEN DO YOU HAVE 6 OR MORE DRINKS ON ONE OCCASION?: NEVER

## 2020-11-03 SDOH — HEALTH STABILITY: MENTAL HEALTH: HOW MANY STANDARD DRINKS CONTAINING ALCOHOL DO YOU HAVE ON A TYPICAL DAY?: 1 OR 2

## 2020-11-03 SDOH — HEALTH STABILITY: MENTAL HEALTH: HOW OFTEN DO YOU HAVE A DRINK CONTAINING ALCOHOL?: MONTHLY OR LESS

## 2020-11-03 ASSESSMENT — PATIENT HEALTH QUESTIONNAIRE - PHQ9
SUM OF ALL RESPONSES TO PHQ9 QUESTIONS 1 AND 2: 0
SUM OF ALL RESPONSES TO PHQ9 QUESTIONS 1 AND 2: 0
2. FEELING DOWN, DEPRESSED OR HOPELESS: NOT AT ALL
1. LITTLE INTEREST OR PLEASURE IN DOING THINGS: NOT AT ALL
CLINICAL INTERPRETATION OF PHQ2 SCORE: NO FURTHER SCREENING NEEDED
CLINICAL INTERPRETATION OF PHQ9 SCORE: NO FURTHER SCREENING NEEDED

## 2020-11-06 LAB — PATH REPORT, THINPREP: NORMAL

## 2020-11-13 ENCOUNTER — TELEPHONE (OUTPATIENT)
Dept: OBGYN | Age: 40
End: 2020-11-13

## 2020-11-13 RX ORDER — METRONIDAZOLE 500 MG/1
500 TABLET ORAL 2 TIMES DAILY
Qty: 14 TABLET | Refills: 0 | Status: SHIPPED | OUTPATIENT
Start: 2020-11-13 | End: 2020-11-20

## 2020-12-09 ENCOUNTER — TELEPHONE (OUTPATIENT)
Dept: FAMILY MEDICINE CLINIC | Facility: CLINIC | Age: 40
End: 2020-12-09

## 2020-12-09 NOTE — TELEPHONE ENCOUNTER
Patient reports headache since Sunday, has taken Tylenol with minimal relief. Was also having sinus pressure and took otc sinus medicine.      Blood pressure 173/87, HR 69 today at 9:00am    Patient reports blurry vision and mild dizziness started today thi

## 2020-12-09 NOTE — TELEPHONE ENCOUNTER
Pt called and said she has been having a headache since yesterday and her B/P pressure is 147/88 this morning. She also has blurred vision. She is set up for tomorrow for a Doximity visit.

## 2020-12-09 NOTE — TELEPHONE ENCOUNTER
If symptoms worsen, she should go to immediate care    Otherwise, if symptoms improve - we will followup tomorrow as planned

## 2020-12-10 ENCOUNTER — TELEMEDICINE (OUTPATIENT)
Dept: FAMILY MEDICINE CLINIC | Facility: CLINIC | Age: 40
End: 2020-12-10
Payer: COMMERCIAL

## 2020-12-10 DIAGNOSIS — G44.89 OTHER HEADACHE SYNDROME: Primary | ICD-10-CM

## 2020-12-10 DIAGNOSIS — H53.8 BLURRED VISION: ICD-10-CM

## 2020-12-10 PROCEDURE — 99214 OFFICE O/P EST MOD 30 MIN: CPT | Performed by: FAMILY MEDICINE

## 2020-12-10 RX ORDER — FLUTICASONE PROPIONATE 50 MCG
2 SPRAY, SUSPENSION (ML) NASAL DAILY
Qty: 1 BOTTLE | Refills: 2 | Status: SHIPPED | OUTPATIENT
Start: 2020-12-10 | End: 2021-02-22

## 2020-12-10 NOTE — PATIENT INSTRUCTIONS
-start flonase and anti-histamine daily  -c/w tylenol as needed  -expectant management  -see eye doctor if blurry vision comes back  -go to ER if vision significantly changes  -otherwise, touch base with us if symptoms not improving next week with above

## 2020-12-10 NOTE — PROGRESS NOTES
Virtual/Telephone Check-In    Oli Ruvalcaba is a 45year old female here today for a telemedicine audio and video visit. HPI:       1. Other headache syndrome  2.  Blurred vision  -has been having more headaches since her covid infection 6 months ag Used    Alcohol use: No      Alcohol/week: 0.0 standard drinks    Drug use: No         Medications (Active prior to today's visit):  Current Outpatient Medications   Medication Sig Dispense Refill   • Fluticasone Propionate 50 MCG/ACT Nasal Suspension 2 sp telehealth visit, including treatment limitations as no physical exam could be performed. The patient was advised to call 911 or to go to the ER in case there was an emergency.   The patient was also advised of the potential privacy & security concerns rel

## 2021-02-22 ENCOUNTER — APPOINTMENT (OUTPATIENT)
Dept: CT IMAGING | Age: 41
End: 2021-02-22
Attending: EMERGENCY MEDICINE
Payer: COMMERCIAL

## 2021-02-22 ENCOUNTER — HOSPITAL ENCOUNTER (EMERGENCY)
Age: 41
Discharge: HOME | End: 2021-02-22
Attending: EMERGENCY MEDICINE
Payer: COMMERCIAL

## 2021-02-22 ENCOUNTER — APPOINTMENT (OUTPATIENT)
Dept: GENERAL RADIOLOGY | Age: 41
End: 2021-02-22
Attending: EMERGENCY MEDICINE
Payer: COMMERCIAL

## 2021-02-22 VITALS
RESPIRATION RATE: 12 BRPM | HEART RATE: 70 BPM | HEIGHT: 59 IN | BODY MASS INDEX: 26.21 KG/M2 | SYSTOLIC BLOOD PRESSURE: 118 MMHG | DIASTOLIC BLOOD PRESSURE: 54 MMHG | OXYGEN SATURATION: 98 % | WEIGHT: 130 LBS | TEMPERATURE: 97 F

## 2021-02-22 DIAGNOSIS — S09.90XA CLOSED HEAD INJURY, INITIAL ENCOUNTER: Primary | ICD-10-CM

## 2021-02-22 DIAGNOSIS — S63.502A SPRAIN OF LEFT WRIST, INITIAL ENCOUNTER: ICD-10-CM

## 2021-02-22 PROCEDURE — 99284 EMERGENCY DEPT VISIT MOD MDM: CPT

## 2021-02-22 PROCEDURE — 73110 X-RAY EXAM OF WRIST: CPT | Performed by: EMERGENCY MEDICINE

## 2021-02-22 PROCEDURE — 70450 CT HEAD/BRAIN W/O DYE: CPT | Performed by: EMERGENCY MEDICINE

## 2021-02-22 PROCEDURE — 29125 APPL SHORT ARM SPLINT STATIC: CPT

## 2021-02-22 RX ORDER — ACETAMINOPHEN 500 MG
1000 TABLET ORAL ONCE
Status: COMPLETED | OUTPATIENT
Start: 2021-02-22 | End: 2021-02-22

## 2021-02-22 NOTE — ED PROVIDER NOTES
Patient Seen in: THE [facility] Emergency Department In [place]      History   Patient presents with:  Fall    Stated Complaint: slipped on ice- pain to l wrist, head,back pain    HPI/Subjective:   HPI    Patient is a 41-year-old woman who slipped and fell on the scaphoid. No guarding or rebound. ED Course   Labs Reviewed - No data to display       Slip and fall with closed head injury, lower back pain, left wrist pain. Will check head CT given the worsening headache.   Will check x-rays of the left wrist,

## 2021-02-22 NOTE — ED INITIAL ASSESSMENT (HPI)
Slipped on ice today and hit back of head on ground, denies loc.   Reports left wrist pain and low back pain

## 2021-03-15 ENCOUNTER — TELEMEDICINE (OUTPATIENT)
Dept: FAMILY MEDICINE CLINIC | Facility: CLINIC | Age: 41
End: 2021-03-15
Payer: COMMERCIAL

## 2021-03-15 DIAGNOSIS — H00.014 HORDEOLUM EXTERNUM OF LEFT UPPER EYELID: Primary | ICD-10-CM

## 2021-03-15 PROCEDURE — 99214 OFFICE O/P EST MOD 30 MIN: CPT | Performed by: FAMILY MEDICINE

## 2021-03-15 RX ORDER — ERYTHROMYCIN 5 MG/G
OINTMENT OPHTHALMIC
Qty: 1 G | Refills: 0 | Status: SHIPPED | OUTPATIENT
Start: 2021-03-15 | End: 2021-07-08 | Stop reason: ALTCHOICE

## 2021-03-15 NOTE — PROGRESS NOTES
Virtual/Telephone Check-In    Med Posadas is a 45year old female here today for a telemedicine audio and video visit. HPI:       1.  Hordeolum externum of left upper eyelid  -started several days ago  -has been using warm compress with minimal re Ascorbic Acid (VITAMIN C OR) Take by mouth. • Multiple Vitamin (MULTI-VITAMIN) Oral Tab Take 1 tablet by mouth daily. • acetaminophen 500 MG Oral Tab Take 1,000 mg by mouth every 4 (four) hours as needed for Pain.  Last dose 0900         Allergies: medications, radiology tests and decision making. Appropriate medical decision-making and tests are ordered as detailed in the plan of care above.   Coding/billing information is submitted for this visit based on complexity of care and/or time spent for the

## 2021-07-06 ENCOUNTER — TELEPHONE (OUTPATIENT)
Dept: FAMILY MEDICINE CLINIC | Facility: CLINIC | Age: 41
End: 2021-07-06

## 2021-07-06 NOTE — TELEPHONE ENCOUNTER
Spoke to patient. States she is not \"feeling herself\". Tearful on the phone. Denies suicidal ideations. States has a foggy brain and cant concentrate. Offered appointments but she declined because they are not within a couple days.   Offered behavior

## 2021-07-08 NOTE — PATIENT INSTRUCTIONS
Take off the 19th and followup with me at 2:30pm to see how you're doing    Plan to start escitalopram 1/2 tab daily x 4 days, then 1 tab daily (can continue 1/2 tab daily if you need more time to adjust)    Hydroxyzine as needed for severe anxiety    If

## 2021-07-12 NOTE — PROGRESS NOTES
Clearance Hien is a 37year old female here for Patient presents with:  Depression: Getting worse over the last 3 weeks      HPI:       1. Current moderate episode of major depressive disorder without prior episode ([address]  2.  Anxiety  -notes significant w daily.          Allergies:    Shellfish-Derived P*    HIVES  Latex                   RASH      ROS:   See HPI for relevant ROS    --GEN: No other complaints  --HEENT: No other complaints  --RESP: No other complaints  --CV: No other complaints  --GI: No othe Referrals:  [facility] 2D+3D SCREENING BILAT (CPT=[id number])     Chaparrita Lisa MD    I spent a total of 40 minutes, more than half of which was spent counseling/coordinating care regarding depression

## 2021-07-21 ENCOUNTER — PATIENT MESSAGE (OUTPATIENT)
Dept: FAMILY MEDICINE CLINIC | Facility: CLINIC | Age: 41
End: 2021-07-21

## 2021-07-22 NOTE — TELEPHONE ENCOUNTER
From: Marjorie Martinez  To: Clem Fu MD  Sent: 7/21/2021 6:01 PM CDT  Subject: Visit Follow-up Question    Sebastián Bernstein  - I was unable to make our appt as I had extended my stay and was in unable to call the office due to service.  I'm back and am sti

## 2021-07-23 NOTE — TELEPHONE ENCOUNTER
From: Maxwell Oliveira  To: Carol Valentino MD  Sent: 7/22/2021 10:22 PM CDT  Subject: Visit Follow-up Question    Hi Dr Manya Mcardle- I ended up being taken from work to the ER, I am home now they did multiple tests but in the end they gave me something for vert

## 2021-07-26 NOTE — TELEPHONE ENCOUNTER
Have her increase lexapro to 2 tabs (20mg) daily until her appt with me    Referral and meclizine and note signed    Thanks

## 2021-07-26 NOTE — TELEPHONE ENCOUNTER
Patient walked in today thinking she had an appointment per previous [employer] message. Reports her anxiety and panic attacks are getting worse. She is still having on and off dizziness and headaches.  Was diagnosed with vertigo in the ED and given [facility]

## 2021-07-26 NOTE — TELEPHONE ENCOUNTER
Relayed recommendations to patient. She VU and agreed with plan. She confirmed appointment for Thursday.      Medication list updated

## 2021-07-27 ENCOUNTER — TELEPHONE (OUTPATIENT)
Dept: FAMILY MEDICINE CLINIC | Facility: CLINIC | Age: 41
End: 2021-07-27

## 2021-07-27 NOTE — TELEPHONE ENCOUNTER
Received Disability paperwork via fax, pt has appt on 07/29/2021 for anxiety and panic attacks, called pt and left a message to see what the disability is for, gave paperwork to [address] [place] [address] Elie1 C/Connor Murphy.

## 2021-07-29 ENCOUNTER — TELEPHONE (OUTPATIENT)
Dept: FAMILY MEDICINE CLINIC | Facility: CLINIC | Age: 41
End: 2021-07-29

## 2021-07-29 NOTE — PROGRESS NOTES
Marjorie Martinez is a 37year old female here for Patient presents with:  Depression  Anxiety: During work on Thursday of last week patient had a panic attack, Patient was taken to [facility], here for follow up. HPI:       1. Anxiety  2.  Panic a • Multiple Vitamin (MULTI-VITAMIN) Oral Tab Take 1 tablet by mouth daily.          Allergies:    Shellfish-Derived P*    HIVES  Adhesive Tape (Geeta*    RASH  Latex                   RASH      ROS:   See HPI for relevant ROS    --GEN: No other complaints

## 2021-08-12 ENCOUNTER — HOSPITAL ENCOUNTER (OUTPATIENT)
Dept: MAMMOGRAPHY | Age: 41
Discharge: HOME | End: 2021-08-12
Attending: FAMILY MEDICINE
Payer: COMMERCIAL

## 2021-08-12 DIAGNOSIS — Z12.31 ENCOUNTER FOR SCREENING MAMMOGRAM FOR MALIGNANT NEOPLASM OF BREAST: ICD-10-CM

## 2021-08-12 PROCEDURE — 77067 SCR MAMMO BI INCL CAD: CPT | Performed by: FAMILY MEDICINE

## 2021-08-12 PROCEDURE — 77063 BREAST TOMOSYNTHESIS BI: CPT | Performed by: FAMILY MEDICINE

## 2021-08-16 ENCOUNTER — HOSPITAL ENCOUNTER (OUTPATIENT)
Dept: MAMMOGRAPHY | Age: 41
Discharge: HOME | End: 2021-08-16
Attending: FAMILY MEDICINE
Payer: COMMERCIAL

## 2021-08-16 ENCOUNTER — HOSPITAL ENCOUNTER (OUTPATIENT)
Dept: ULTRASOUND IMAGING | Age: 41
Discharge: HOME | End: 2021-08-16
Attending: FAMILY MEDICINE
Payer: COMMERCIAL

## 2021-08-16 DIAGNOSIS — R92.2 INCONCLUSIVE MAMMOGRAM: ICD-10-CM

## 2021-08-16 PROCEDURE — 76642 ULTRASOUND BREAST LIMITED: CPT | Performed by: FAMILY MEDICINE

## 2021-08-16 PROCEDURE — 77062 BREAST TOMOSYNTHESIS BI: CPT | Performed by: FAMILY MEDICINE

## 2021-08-16 PROCEDURE — 77066 DX MAMMO INCL CAD BI: CPT | Performed by: FAMILY MEDICINE

## 2021-08-18 ENCOUNTER — OFFICE VISIT (OUTPATIENT)
Dept: FAMILY MEDICINE CLINIC | Facility: CLINIC | Age: 41
End: 2021-08-18
Payer: COMMERCIAL

## 2021-08-18 VITALS
OXYGEN SATURATION: 99 % | HEIGHT: 58 IN | DIASTOLIC BLOOD PRESSURE: 70 MMHG | SYSTOLIC BLOOD PRESSURE: 102 MMHG | BODY MASS INDEX: 32.12 KG/M2 | TEMPERATURE: 97 F | HEART RATE: 66 BPM | WEIGHT: 153 LBS

## 2021-08-18 DIAGNOSIS — Z23 NEED FOR VACCINATION: ICD-10-CM

## 2021-08-18 DIAGNOSIS — F41.9 ANXIETY: ICD-10-CM

## 2021-08-18 DIAGNOSIS — F41.0 PANIC ATTACKS: ICD-10-CM

## 2021-08-18 DIAGNOSIS — F32.1 CURRENT MODERATE EPISODE OF MAJOR DEPRESSIVE DISORDER WITHOUT PRIOR EPISODE (HCC): ICD-10-CM

## 2021-08-18 DIAGNOSIS — Z00.00 ROUTINE GENERAL MEDICAL EXAMINATION AT A HEALTH CARE FACILITY: Primary | ICD-10-CM

## 2021-08-18 PROCEDURE — 90715 TDAP VACCINE 7 YRS/> IM: CPT | Performed by: FAMILY MEDICINE

## 2021-08-18 PROCEDURE — 3074F SYST BP LT 130 MM HG: CPT | Performed by: FAMILY MEDICINE

## 2021-08-18 PROCEDURE — 90471 IMMUNIZATION ADMIN: CPT | Performed by: FAMILY MEDICINE

## 2021-08-18 PROCEDURE — 99214 OFFICE O/P EST MOD 30 MIN: CPT | Performed by: FAMILY MEDICINE

## 2021-08-18 PROCEDURE — 3078F DIAST BP <80 MM HG: CPT | Performed by: FAMILY MEDICINE

## 2021-08-18 PROCEDURE — 99396 PREV VISIT EST AGE 40-64: CPT | Performed by: FAMILY MEDICINE

## 2021-08-18 PROCEDURE — 3008F BODY MASS INDEX DOCD: CPT | Performed by: FAMILY MEDICINE

## 2021-08-18 RX ORDER — TRAZODONE HYDROCHLORIDE 50 MG/1
TABLET ORAL NIGHTLY PRN
Qty: 30 TABLET | Refills: 1 | Status: SHIPPED | OUTPATIENT
Start: 2021-08-18 | End: 2021-11-18

## 2021-08-18 NOTE — PATIENT INSTRUCTIONS
Go to [facility] for fasting bloodwork    Try trazodone as needed for sleep    Continue all other meds    Continue to work on diet and exericise    Continue with therapy    Followup in 6 wks    Touch base sooner about return to work date after your meeting you have joint pains with walking/jogging/running    For sleep:  -make sure room is dark and quiet  -no reading, tv, phone, tablets, computers in bed - these can activate the brain over time and associate being awake with being in the bed  -if you are not

## 2021-08-18 NOTE — PROGRESS NOTES
Coty Horn is a 37year old female who is here for Patient presents with:  Wellness Visit  Sleep Problem: Still not sleeping good. patient is sleeping an average of  5 hours. Anxiety: Follow up      HPI:     1.  Routine general medical examination Yes      Wt Readings from Last 6 Encounters:  08/18/21 : 153 lb (69.4 kg)  07/29/21 : 149 lb (67.6 kg)  07/08/21 : 150 lb (68 kg)  03/01/21 : 130 lb (59 kg)  02/22/21 : 130 lb (59 kg)  05/08/20 : 120 lb (54.4 kg)      Patient Active Problem List:     Pre-o used    Alcohol use: Yes      Comment: 1-2 drinks monthly    Drug use: No          EXAM:   /70   Pulse 66   Temp 97.4 °F (36.3 °C) (Other)   Ht 4' 10\" (1.473 m)   Wt 153 lb (69.4 kg)   LMP 08/18/2021   SpO2 99%   BMI 31.98 kg/m²     GENERAL: A&O, in TdaP (Boostrix/Adacel) Vaccine (> 7 Y)      Meds This Visit:  Requested Prescriptions     Signed Prescriptions Disp Refills   • traZODone 50 MG Oral Tab 30 tablet 1     Sig: Take 0.5-1 tablets (25-50 mg total) by mouth nightly as needed for Sleep.        Im

## 2021-08-23 ENCOUNTER — TELEPHONE (OUTPATIENT)
Dept: FAMILY MEDICINE CLINIC | Facility: CLINIC | Age: 41
End: 2021-08-23

## 2021-08-23 NOTE — TELEPHONE ENCOUNTER
LOV 8/18/2021  2. Current moderate episode of major depressive disorder without prior episode ([facility] [address]  3. Anxiety  4.  Panic attacks  -better  -c/w lexapro 10  -c/w counseling  -trial of trazodone prn sleep  -call us after work meeting to decide on return to

## 2021-08-24 NOTE — TELEPHONE ENCOUNTER
Paperwork completed and signed and faxed. Original to scan. Copy in triage. Left detailed message on VM of patient.

## 2021-10-05 ENCOUNTER — OFFICE VISIT (OUTPATIENT)
Dept: FAMILY MEDICINE CLINIC | Facility: CLINIC | Age: 41
End: 2021-10-05
Payer: COMMERCIAL

## 2021-10-05 VITALS
DIASTOLIC BLOOD PRESSURE: 66 MMHG | WEIGHT: 163 LBS | OXYGEN SATURATION: 98 % | SYSTOLIC BLOOD PRESSURE: 100 MMHG | BODY MASS INDEX: 34.22 KG/M2 | HEART RATE: 72 BPM | TEMPERATURE: 97 F | HEIGHT: 58 IN

## 2021-10-05 DIAGNOSIS — F32.1 CURRENT MODERATE EPISODE OF MAJOR DEPRESSIVE DISORDER WITHOUT PRIOR EPISODE (HCC): ICD-10-CM

## 2021-10-05 DIAGNOSIS — Z23 NEED FOR VACCINATION: Primary | ICD-10-CM

## 2021-10-05 DIAGNOSIS — F41.9 ANXIETY: ICD-10-CM

## 2021-10-05 DIAGNOSIS — F41.0 PANIC ATTACKS: ICD-10-CM

## 2021-10-05 PROCEDURE — 3008F BODY MASS INDEX DOCD: CPT | Performed by: FAMILY MEDICINE

## 2021-10-05 PROCEDURE — 3078F DIAST BP <80 MM HG: CPT | Performed by: FAMILY MEDICINE

## 2021-10-05 PROCEDURE — 90471 IMMUNIZATION ADMIN: CPT | Performed by: FAMILY MEDICINE

## 2021-10-05 PROCEDURE — 3074F SYST BP LT 130 MM HG: CPT | Performed by: FAMILY MEDICINE

## 2021-10-05 PROCEDURE — 90686 IIV4 VACC NO PRSV 0.5 ML IM: CPT | Performed by: FAMILY MEDICINE

## 2021-10-05 PROCEDURE — 99214 OFFICE O/P EST MOD 30 MIN: CPT | Performed by: FAMILY MEDICINE

## 2021-10-05 RX ORDER — BUSPIRONE HYDROCHLORIDE 7.5 MG/1
7.5 TABLET ORAL 2 TIMES DAILY
Qty: 60 TABLET | Refills: 2 | Status: SHIPPED | OUTPATIENT
Start: 2021-10-05 | End: 2021-10-05

## 2021-10-05 RX ORDER — ESCITALOPRAM OXALATE 20 MG/1
20 TABLET ORAL DAILY
Qty: 90 TABLET | Refills: 1 | Status: SHIPPED | OUTPATIENT
Start: 2021-10-05 | End: 2021-10-19

## 2021-10-05 NOTE — PATIENT INSTRUCTIONS
Increase escitalopram to 20mg daily    If no significant improvement in 2-4 wks, let me know on mychart    Make sure pharmacy has cancelled prescription for buspirone for now - we will consider it if needed in future    Continue trazodone nightly    Cont

## 2021-10-19 ENCOUNTER — APPOINTMENT (OUTPATIENT)
Dept: MRI IMAGING | Age: 41
End: 2021-10-19
Attending: EMERGENCY MEDICINE
Payer: COMMERCIAL

## 2021-10-19 ENCOUNTER — HOSPITAL ENCOUNTER (EMERGENCY)
Age: 41
Discharge: HOME | End: 2021-10-19
Attending: EMERGENCY MEDICINE
Payer: COMMERCIAL

## 2021-10-19 VITALS
TEMPERATURE: 97 F | RESPIRATION RATE: 20 BRPM | OXYGEN SATURATION: 100 % | HEIGHT: 59 IN | DIASTOLIC BLOOD PRESSURE: 47 MMHG | HEART RATE: 73 BPM | WEIGHT: 150 LBS | BODY MASS INDEX: 30.24 KG/M2 | SYSTOLIC BLOOD PRESSURE: 105 MMHG

## 2021-10-19 DIAGNOSIS — R20.2 FACIAL PARESTHESIA: ICD-10-CM

## 2021-10-19 DIAGNOSIS — R42 DIZZINESS: Primary | ICD-10-CM

## 2021-10-19 PROCEDURE — 99284 EMERGENCY DEPT VISIT MOD MDM: CPT

## 2021-10-19 PROCEDURE — 80053 COMPREHEN METABOLIC PANEL: CPT | Performed by: EMERGENCY MEDICINE

## 2021-10-19 PROCEDURE — 81025 URINE PREGNANCY TEST: CPT

## 2021-10-19 PROCEDURE — 85025 COMPLETE CBC W/AUTO DIFF WBC: CPT | Performed by: EMERGENCY MEDICINE

## 2021-10-19 PROCEDURE — 93010 ELECTROCARDIOGRAM REPORT: CPT

## 2021-10-19 PROCEDURE — 87086 URINE CULTURE/COLONY COUNT: CPT | Performed by: EMERGENCY MEDICINE

## 2021-10-19 PROCEDURE — 70551 MRI BRAIN STEM W/O DYE: CPT | Performed by: EMERGENCY MEDICINE

## 2021-10-19 PROCEDURE — 93005 ELECTROCARDIOGRAM TRACING: CPT

## 2021-10-19 PROCEDURE — 81001 URINALYSIS AUTO W/SCOPE: CPT | Performed by: EMERGENCY MEDICINE

## 2021-10-19 PROCEDURE — 84484 ASSAY OF TROPONIN QUANT: CPT | Performed by: EMERGENCY MEDICINE

## 2021-10-19 PROCEDURE — 99285 EMERGENCY DEPT VISIT HI MDM: CPT

## 2021-10-19 PROCEDURE — 36415 COLL VENOUS BLD VENIPUNCTURE: CPT

## 2021-11-01 NOTE — PROGRESS NOTES
Aryan Blakely is a 37year old female here for Patient presents with: Anxiety: Anxiety feels better but patient has her days. HPI:       1. Anxiety  2. Panic attacks  3.  Current moderate episode of major depressive disorder without prior episode ( are negative    PHYSICAL EXAM:   /66   Pulse 72   Temp 97.1 °F (36.2 °C) (Other)   Ht 4' 10\" (1.473 m)   Wt 163 lb (73.9 kg)   LMP 09/29/2021   SpO2 98%   Breastfeeding No   BMI 34.07 kg/m²     Gen: NAD  HEENT: NCAT, pupils equal and round  Pulm: CT

## 2021-11-16 ENCOUNTER — TELEPHONE (OUTPATIENT)
Dept: OBGYN | Age: 41
End: 2021-11-16

## 2021-11-18 PROBLEM — F33.1 MAJOR DEPRESSIVE DISORDER, RECURRENT, MODERATE (HCC): Status: ACTIVE | Noted: 2021-11-18

## 2021-11-18 PROBLEM — F41.9 ANXIETY: Status: ACTIVE | Noted: 2021-11-18

## 2021-11-18 NOTE — PROGRESS NOTES
Javad Campo is a 37year old female here for Patient presents with: Anxiety      HPI:       1. Anxiety  2. Panic attacks  3.  Current moderate episode of major depressive disorder without prior episode ([facility] [address]  -doing better  -trazodone helping sleep an 4' 11\" (1.499 m)   Wt 160 lb (72.6 kg)   LMP 10/19/2021   SpO2 98%   BMI 32.32 kg/m²     Gen: NAD  HEENT: NCAT, pupils equal and round  Psych: normal affect     ASSESSMENT/PLAN:     1. Anxiety  2. Panic attacks  3.  Current moderate episode of major depres

## 2021-11-18 NOTE — PATIENT INSTRUCTIONS
Continue all meds as prescribed    Continue to look into other job opportunities    Think about what's best for you and your family    Followup in 3 months

## 2021-11-26 ENCOUNTER — OFFICE VISIT (OUTPATIENT)
Dept: OBGYN | Age: 41
End: 2021-11-26

## 2021-11-26 ENCOUNTER — LAB SERVICES (OUTPATIENT)
Dept: LAB | Age: 41
End: 2021-11-26

## 2021-11-26 VITALS
HEIGHT: 59 IN | BODY MASS INDEX: 33.4 KG/M2 | SYSTOLIC BLOOD PRESSURE: 103 MMHG | OXYGEN SATURATION: 95 % | RESPIRATION RATE: 16 BRPM | DIASTOLIC BLOOD PRESSURE: 64 MMHG | WEIGHT: 165.68 LBS | HEART RATE: 70 BPM

## 2021-11-26 DIAGNOSIS — N92.1 METRORRHAGIA: Primary | ICD-10-CM

## 2021-11-26 DIAGNOSIS — R10.2 PELVIC PAIN: ICD-10-CM

## 2021-11-26 DIAGNOSIS — N39.3 STRESS INCONTINENCE OF URINE: ICD-10-CM

## 2021-11-26 DIAGNOSIS — N92.1 METRORRHAGIA: ICD-10-CM

## 2021-11-26 LAB
APPEARANCE, POC: CLEAR
BASOPHILS # BLD: 0 K/MCL (ref 0–0.3)
BASOPHILS NFR BLD: 0 %
BILIRUBIN, POC: NEGATIVE
COLOR, POC: YELLOW
DEPRECATED RDW RBC: 44.2 FL (ref 39–50)
EOSINOPHIL # BLD: 0 K/MCL (ref 0–0.5)
EOSINOPHIL NFR BLD: 1 %
ERYTHROCYTE [DISTWIDTH] IN BLOOD: 12.5 % (ref 11–15)
GLUCOSE UR-MCNC: NEGATIVE MG/DL
HCT VFR BLD CALC: 39.3 % (ref 36–46.5)
HGB BLD-MCNC: 12.8 G/DL (ref 12–15.5)
IMM GRANULOCYTES # BLD AUTO: 0 K/MCL (ref 0–0.2)
IMM GRANULOCYTES # BLD: 0 %
KETONES, POC: NEGATIVE MG/DL
LYMPHOCYTES # BLD: 1.7 K/MCL (ref 1–4.8)
LYMPHOCYTES NFR BLD: 34 %
MCH RBC QN AUTO: 31.4 PG (ref 26–34)
MCHC RBC AUTO-ENTMCNC: 32.6 G/DL (ref 32–36.5)
MCV RBC AUTO: 96.3 FL (ref 78–100)
MONOCYTES # BLD: 0.5 K/MCL (ref 0.3–0.9)
MONOCYTES NFR BLD: 10 %
NEUTROPHILS # BLD: 2.7 K/MCL (ref 1.8–7.7)
NEUTROPHILS NFR BLD: 55 %
NITRITE, POC: NEGATIVE
NRBC BLD MANUAL-RTO: 0 /100 WBC
OCCULT BLOOD, POC: NEGATIVE
PH UR: 8.5 [PH] (ref 5–7)
PLATELET # BLD AUTO: 275 K/MCL (ref 140–450)
PROLACTIN SERPL-MCNC: 8.6 NG/ML (ref 2.8–29.2)
PROT UR-MCNC: ABNORMAL MG/DL
RBC # BLD: 4.08 MIL/MCL (ref 4–5.2)
SP GR UR: 1.01 (ref 1–1.03)
TSH SERPL-ACNC: 1.63 MCUNITS/ML (ref 0.35–5)
UROBILINOGEN UR-MCNC: 0.2 MG/DL (ref 0–1)
WBC # BLD: 5 K/MCL (ref 4.2–11)
WBC (LEUKOCYTE) ESTERASE, POC: NEGATIVE

## 2021-11-26 PROCEDURE — 84146 ASSAY OF PROLACTIN: CPT | Performed by: PSYCHIATRY & NEUROLOGY

## 2021-11-26 PROCEDURE — 87491 CHLMYD TRACH DNA AMP PROBE: CPT | Performed by: PSYCHIATRY & NEUROLOGY

## 2021-11-26 PROCEDURE — 87591 N.GONORRHOEAE DNA AMP PROB: CPT | Performed by: PSYCHIATRY & NEUROLOGY

## 2021-11-26 PROCEDURE — 81003 URINALYSIS AUTO W/O SCOPE: CPT | Performed by: OBSTETRICS & GYNECOLOGY

## 2021-11-26 PROCEDURE — 84443 ASSAY THYROID STIM HORMONE: CPT | Performed by: PSYCHIATRY & NEUROLOGY

## 2021-11-26 PROCEDURE — 99214 OFFICE O/P EST MOD 30 MIN: CPT | Performed by: OBSTETRICS & GYNECOLOGY

## 2021-11-26 PROCEDURE — 36415 COLL VENOUS BLD VENIPUNCTURE: CPT | Performed by: PSYCHIATRY & NEUROLOGY

## 2021-11-26 PROCEDURE — 85025 COMPLETE CBC W/AUTO DIFF WBC: CPT | Performed by: PSYCHIATRY & NEUROLOGY

## 2021-11-26 RX ORDER — HYDROXYZINE HYDROCHLORIDE 25 MG/1
25 TABLET, FILM COATED ORAL PRN
COMMUNITY
Start: 2021-11-18

## 2021-11-26 RX ORDER — TRAZODONE HYDROCHLORIDE 50 MG/1
50 TABLET ORAL NIGHTLY
COMMUNITY
Start: 2021-11-18

## 2021-11-26 ASSESSMENT — PAIN SCALES - GENERAL: PAINLEVEL: 0

## 2021-11-27 ENCOUNTER — IMAGING SERVICES (OUTPATIENT)
Dept: ULTRASOUND IMAGING | Age: 41
End: 2021-11-27
Attending: OBSTETRICS & GYNECOLOGY

## 2021-11-27 DIAGNOSIS — R10.2 PELVIC PAIN: ICD-10-CM

## 2021-11-27 DIAGNOSIS — N92.1 METRORRHAGIA: ICD-10-CM

## 2021-11-27 PROCEDURE — 76856 US EXAM PELVIC COMPLETE: CPT | Performed by: RADIOLOGY

## 2021-11-27 PROCEDURE — 76830 TRANSVAGINAL US NON-OB: CPT | Performed by: RADIOLOGY

## 2021-11-29 LAB
C TRACH RRNA SPEC QL NAA+PROBE: NEGATIVE
Lab: NORMAL
N GONORRHOEA RRNA SPEC QL NAA+PROBE: NEGATIVE

## 2021-12-02 ENCOUNTER — OFFICE VISIT (OUTPATIENT)
Dept: OBGYN | Age: 41
End: 2021-12-02

## 2021-12-02 ENCOUNTER — DOCUMENTATION (OUTPATIENT)
Dept: OBGYN | Age: 41
End: 2021-12-02

## 2021-12-02 ENCOUNTER — TELEPHONE (OUTPATIENT)
Dept: OBGYN | Age: 41
End: 2021-12-02

## 2021-12-02 VITALS
WEIGHT: 160.72 LBS | OXYGEN SATURATION: 99 % | BODY MASS INDEX: 32.46 KG/M2 | HEART RATE: 80 BPM | DIASTOLIC BLOOD PRESSURE: 77 MMHG | RESPIRATION RATE: 16 BRPM | SYSTOLIC BLOOD PRESSURE: 124 MMHG | TEMPERATURE: 97.8 F

## 2021-12-02 DIAGNOSIS — R10.2 PELVIC PAIN IN FEMALE: Primary | ICD-10-CM

## 2021-12-02 DIAGNOSIS — Z01.812 ENCOUNTER FOR PREPROCEDURAL LABORATORY EXAMINATION: ICD-10-CM

## 2021-12-02 DIAGNOSIS — N92.1 METRORRHAGIA: ICD-10-CM

## 2021-12-02 PROCEDURE — 99214 OFFICE O/P EST MOD 30 MIN: CPT | Performed by: OBSTETRICS & GYNECOLOGY

## 2021-12-02 SDOH — HEALTH STABILITY: MENTAL HEALTH: HOW MANY STANDARD DRINKS CONTAINING ALCOHOL DO YOU HAVE ON A TYPICAL DAY?: 0,1 OR 2

## 2021-12-02 SDOH — HEALTH STABILITY: MENTAL HEALTH
STRESS IS WHEN SOMEONE FEELS TENSE, NERVOUS, ANXIOUS, OR CAN'T SLEEP AT NIGHT BECAUSE THEIR MIND IS TROUBLED. HOW STRESSED ARE YOU?: VERY MUCH

## 2021-12-02 SDOH — HEALTH STABILITY: PHYSICAL HEALTH: ON AVERAGE, HOW MANY MINUTES DO YOU ENGAGE IN EXERCISE AT THIS LEVEL?: 30 MIN

## 2021-12-02 SDOH — HEALTH STABILITY: PHYSICAL HEALTH: ON AVERAGE, HOW MANY DAYS PER WEEK DO YOU ENGAGE IN MODERATE TO STRENUOUS EXERCISE (LIKE A BRISK WALK)?: 2 DAYS

## 2021-12-02 SDOH — HEALTH STABILITY: MENTAL HEALTH: AUDIT TOTAL SCORE: 1

## 2021-12-02 SDOH — HEALTH STABILITY: MENTAL HEALTH: HOW OFTEN DO YOU HAVE A DRINK CONTAINING ALCOHOL?: MONTHLY OR LESS

## 2021-12-02 SDOH — HEALTH STABILITY: MENTAL HEALTH: HOW OFTEN DO YOU HAVE 6 OR MORE DRINKS ON ONE OCCASION?: NEVER

## 2021-12-02 ASSESSMENT — PATIENT HEALTH QUESTIONNAIRE - PHQ9
SUM OF ALL RESPONSES TO PHQ9 QUESTIONS 1 AND 2: 0
SUM OF ALL RESPONSES TO PHQ9 QUESTIONS 1 AND 2: 0
2. FEELING DOWN, DEPRESSED OR HOPELESS: NOT AT ALL
CLINICAL INTERPRETATION OF PHQ2 SCORE: NO FURTHER SCREENING NEEDED
1. LITTLE INTEREST OR PLEASURE IN DOING THINGS: NOT AT ALL

## 2021-12-02 ASSESSMENT — PAIN SCALES - GENERAL: PAINLEVEL: 0

## 2021-12-15 ENCOUNTER — DOCUMENTATION (OUTPATIENT)
Dept: OBGYN | Age: 41
End: 2021-12-15

## 2021-12-18 ENCOUNTER — LAB SERVICES (OUTPATIENT)
Dept: LAB | Age: 41
End: 2021-12-18

## 2021-12-18 DIAGNOSIS — N92.1 METRORRHAGIA: ICD-10-CM

## 2021-12-18 DIAGNOSIS — Z01.812 PRE-PROCEDURAL LABORATORY EXAMINATION: ICD-10-CM

## 2021-12-18 LAB
ALBUMIN SERPL-MCNC: 4.3 G/DL (ref 3.6–5.1)
ALBUMIN/GLOB SERPL: 1.4 {RATIO} (ref 1–2.4)
ALP SERPL-CCNC: 57 UNITS/L (ref 45–117)
ALT SERPL-CCNC: 20 UNITS/L
ANION GAP SERPL CALC-SCNC: 8 MMOL/L (ref 10–20)
AST SERPL-CCNC: 15 UNITS/L
BILIRUB SERPL-MCNC: 0.6 MG/DL (ref 0.2–1)
BUN SERPL-MCNC: 10 MG/DL (ref 6–20)
BUN/CREAT SERPL: 17 (ref 7–25)
CALCIUM SERPL-MCNC: 9.2 MG/DL (ref 8.4–10.2)
CHLORIDE SERPL-SCNC: 110 MMOL/L (ref 98–107)
CO2 SERPL-SCNC: 28 MMOL/L (ref 21–32)
CREAT SERPL-MCNC: 0.6 MG/DL (ref 0.51–0.95)
FASTING DURATION TIME PATIENT: 8 HOURS (ref 0–999)
GFR SERPLBLD BASED ON 1.73 SQ M-ARVRAT: >90 ML/MIN
GLOBULIN SER-MCNC: 3 G/DL (ref 2–4)
GLUCOSE SERPL-MCNC: 74 MG/DL (ref 70–99)
POTASSIUM SERPL-SCNC: 4.3 MMOL/L (ref 3.4–5.1)
PROT SERPL-MCNC: 7.3 G/DL (ref 6.4–8.2)
SODIUM SERPL-SCNC: 142 MMOL/L (ref 135–145)

## 2021-12-18 PROCEDURE — 80053 COMPREHEN METABOLIC PANEL: CPT | Performed by: PSYCHIATRY & NEUROLOGY

## 2021-12-18 PROCEDURE — 36415 COLL VENOUS BLD VENIPUNCTURE: CPT | Performed by: PSYCHIATRY & NEUROLOGY

## 2021-12-27 ENCOUNTER — LAB SERVICES (OUTPATIENT)
Dept: LAB | Age: 41
End: 2021-12-27

## 2021-12-27 DIAGNOSIS — N92.1 METRORRHAGIA: ICD-10-CM

## 2021-12-27 DIAGNOSIS — Z01.812 PRE-PROCEDURAL LABORATORY EXAMINATION: ICD-10-CM

## 2021-12-27 DIAGNOSIS — Z01.812 PRE-PROCEDURAL LABORATORY EXAMINATION: Primary | ICD-10-CM

## 2021-12-27 DIAGNOSIS — Z01.812 PRE-PROCEDURE LAB EXAM: Primary | ICD-10-CM

## 2021-12-27 LAB
ABO + RH BLD: NORMAL
BLD GP AB SCN SERPL QL GEL: NEGATIVE
SARS-COV-2 RNA RESP QL NAA+PROBE: NOT DETECTED
SERVICE CMNT-IMP: NORMAL
SERVICE CMNT-IMP: NORMAL
TYPE AND SCREEN EXPIRATION DATE: NORMAL

## 2021-12-27 PROCEDURE — 86900 BLOOD TYPING SEROLOGIC ABO: CPT | Performed by: PSYCHIATRY & NEUROLOGY

## 2021-12-27 PROCEDURE — 86850 RBC ANTIBODY SCREEN: CPT | Performed by: PSYCHIATRY & NEUROLOGY

## 2021-12-27 PROCEDURE — U0005 INFEC AGEN DETEC AMPLI PROBE: HCPCS | Performed by: PSYCHIATRY & NEUROLOGY

## 2021-12-27 PROCEDURE — 86901 BLOOD TYPING SEROLOGIC RH(D): CPT | Performed by: PSYCHIATRY & NEUROLOGY

## 2021-12-27 PROCEDURE — U0003 INFECTIOUS AGENT DETECTION BY NUCLEIC ACID (DNA OR RNA); SEVERE ACUTE RESPIRATORY SYNDROME CORONAVIRUS 2 (SARS-COV-2) (CORONAVIRUS DISEASE [COVID-19]), AMPLIFIED PROBE TECHNIQUE, MAKING USE OF HIGH THROUGHPUT TECHNOLOGIES AS DESCRIBED BY CMS-2020-01-R: HCPCS | Performed by: PSYCHIATRY & NEUROLOGY

## 2021-12-27 PROCEDURE — 36415 COLL VENOUS BLD VENIPUNCTURE: CPT | Performed by: PSYCHIATRY & NEUROLOGY

## 2021-12-29 ENCOUNTER — ANESTHESIA EVENT (OUTPATIENT)
Dept: SURGERY | Age: 41
End: 2021-12-29

## 2021-12-29 ENCOUNTER — ANESTHESIA (OUTPATIENT)
Dept: SURGERY | Age: 41
End: 2021-12-29

## 2021-12-29 ENCOUNTER — HOSPITAL ENCOUNTER (OUTPATIENT)
Age: 41
Discharge: HOME | End: 2021-12-29
Attending: OBSTETRICS & GYNECOLOGY | Admitting: OBSTETRICS & GYNECOLOGY

## 2021-12-29 VITALS
TEMPERATURE: 97.2 F | HEIGHT: 59 IN | DIASTOLIC BLOOD PRESSURE: 61 MMHG | HEART RATE: 66 BPM | BODY MASS INDEX: 30.24 KG/M2 | RESPIRATION RATE: 18 BRPM | OXYGEN SATURATION: 97 % | SYSTOLIC BLOOD PRESSURE: 105 MMHG | WEIGHT: 150 LBS

## 2021-12-29 DIAGNOSIS — N92.1 METRORRHAGIA: ICD-10-CM

## 2021-12-29 DIAGNOSIS — G89.18 POST-OP PAIN: Primary | ICD-10-CM

## 2021-12-29 LAB
B-HCG UR QL: NEGATIVE
B-HCG UR QL: NEGATIVE
INTERNAL PROCEDURAL CONTROLS ACCEPTABLE: YES
INTERNAL PROCEDURAL CONTROLS ACCEPTABLE: YES

## 2021-12-29 PROCEDURE — C9290 INJ, BUPIVACAINE LIPOSOME: HCPCS | Performed by: STUDENT IN AN ORGANIZED HEALTH CARE EDUCATION/TRAINING PROGRAM

## 2021-12-29 PROCEDURE — 10004651 HB RX, NO CHARGE ITEM: Performed by: OBSTETRICS & GYNECOLOGY

## 2021-12-29 PROCEDURE — 10006023 HB SUPPLY 272: Performed by: OBSTETRICS & GYNECOLOGY

## 2021-12-29 PROCEDURE — 13000001 HB PHASE II RECOVERY EA 30 MINUTES: Performed by: OBSTETRICS & GYNECOLOGY

## 2021-12-29 PROCEDURE — 88307 TISSUE EXAM BY PATHOLOGIST: CPT | Performed by: OBSTETRICS & GYNECOLOGY

## 2021-12-29 PROCEDURE — 13000098 HB GENERAL ROBOTIC CASE S/U + 1ST 15 MIN: Performed by: OBSTETRICS & GYNECOLOGY

## 2021-12-29 PROCEDURE — 81025 URINE PREGNANCY TEST: CPT | Performed by: OBSTETRICS & GYNECOLOGY

## 2021-12-29 PROCEDURE — 13000003 HB ANESTHESIA  GENERAL EA ADD MINUTE: Performed by: OBSTETRICS & GYNECOLOGY

## 2021-12-29 PROCEDURE — 10002801 HB RX 250 W/O HCPCS: Performed by: STUDENT IN AN ORGANIZED HEALTH CARE EDUCATION/TRAINING PROGRAM

## 2021-12-29 PROCEDURE — 10002803 HB RX 637: Performed by: OBSTETRICS & GYNECOLOGY

## 2021-12-29 PROCEDURE — 10002807 HB RX 258: Performed by: STUDENT IN AN ORGANIZED HEALTH CARE EDUCATION/TRAINING PROGRAM

## 2021-12-29 PROCEDURE — 10002800 HB RX 250 W HCPCS: Performed by: STUDENT IN AN ORGANIZED HEALTH CARE EDUCATION/TRAINING PROGRAM

## 2021-12-29 PROCEDURE — 58571 TLH W/T/O 250 G OR LESS: CPT | Performed by: OBSTETRICS & GYNECOLOGY

## 2021-12-29 PROCEDURE — 10004451 HB PACU RECOVERY 1ST 30 MINUTES: Performed by: OBSTETRICS & GYNECOLOGY

## 2021-12-29 PROCEDURE — 13000099 HB GENERAL ROBOTIC CASE EA ADD MINUTE: Performed by: OBSTETRICS & GYNECOLOGY

## 2021-12-29 PROCEDURE — 13000002 HB ANESTHESIA  GENERAL  S/U + 1ST 15 MIN: Performed by: OBSTETRICS & GYNECOLOGY

## 2021-12-29 PROCEDURE — 10002807 HB RX 258: Performed by: OBSTETRICS & GYNECOLOGY

## 2021-12-29 RX ORDER — GABAPENTIN 100 MG/1
300 CAPSULE ORAL ONCE
Status: COMPLETED | OUTPATIENT
Start: 2021-12-29 | End: 2021-12-29

## 2021-12-29 RX ORDER — MIDAZOLAM HYDROCHLORIDE 1 MG/ML
INJECTION, SOLUTION INTRAMUSCULAR; INTRAVENOUS PRN
Status: DISCONTINUED | OUTPATIENT
Start: 2021-12-29 | End: 2021-12-29

## 2021-12-29 RX ORDER — SODIUM CHLORIDE, SODIUM LACTATE, POTASSIUM CHLORIDE, CALCIUM CHLORIDE 600; 310; 30; 20 MG/100ML; MG/100ML; MG/100ML; MG/100ML
INJECTION, SOLUTION INTRAVENOUS CONTINUOUS
Status: DISCONTINUED | OUTPATIENT
Start: 2021-12-29 | End: 2021-12-29 | Stop reason: HOSPADM

## 2021-12-29 RX ORDER — IBUPROFEN 600 MG/1
600 TABLET ORAL EVERY 6 HOURS PRN
Qty: 30 TABLET | Refills: 0 | Status: SHIPPED | OUTPATIENT
Start: 2021-12-29

## 2021-12-29 RX ORDER — PHENAZOPYRIDINE HYDROCHLORIDE 100 MG/1
200 TABLET, FILM COATED ORAL ONCE
Status: COMPLETED | OUTPATIENT
Start: 2021-12-29 | End: 2021-12-29

## 2021-12-29 RX ORDER — ACETAMINOPHEN 325 MG/1
650 TABLET ORAL ONCE
Status: COMPLETED | OUTPATIENT
Start: 2021-12-29 | End: 2021-12-29

## 2021-12-29 RX ORDER — DEXAMETHASONE SODIUM PHOSPHATE 4 MG/ML
INJECTION, SOLUTION INTRA-ARTICULAR; INTRALESIONAL; INTRAMUSCULAR; INTRAVENOUS; SOFT TISSUE PRN
Status: DISCONTINUED | OUTPATIENT
Start: 2021-12-29 | End: 2021-12-29

## 2021-12-29 RX ORDER — PROPOFOL 10 MG/ML
INJECTION, EMULSION INTRAVENOUS PRN
Status: DISCONTINUED | OUTPATIENT
Start: 2021-12-29 | End: 2021-12-29

## 2021-12-29 RX ORDER — HYDROCODONE BITARTRATE AND ACETAMINOPHEN 5; 325 MG/1; MG/1
1 TABLET ORAL ONCE
Qty: 20 TABLET | Refills: 0 | OUTPATIENT
Start: 2021-12-29 | End: 2021-12-29

## 2021-12-29 RX ORDER — SODIUM CHLORIDE, SODIUM LACTATE, POTASSIUM CHLORIDE, CALCIUM CHLORIDE 600; 310; 30; 20 MG/100ML; MG/100ML; MG/100ML; MG/100ML
INJECTION, SOLUTION INTRAVENOUS CONTINUOUS
Status: CANCELLED | OUTPATIENT
Start: 2021-12-29

## 2021-12-29 RX ORDER — SODIUM CHLORIDE, SODIUM LACTATE, POTASSIUM CHLORIDE, CALCIUM CHLORIDE 600; 310; 30; 20 MG/100ML; MG/100ML; MG/100ML; MG/100ML
INJECTION, SOLUTION INTRAVENOUS CONTINUOUS PRN
Status: DISCONTINUED | OUTPATIENT
Start: 2021-12-29 | End: 2021-12-29

## 2021-12-29 RX ORDER — LIDOCAINE HYDROCHLORIDE 20 MG/ML
INJECTION, SOLUTION INFILTRATION; PERINEURAL PRN
Status: DISCONTINUED | OUTPATIENT
Start: 2021-12-29 | End: 2021-12-29

## 2021-12-29 RX ORDER — MISOPROSTOL 200 UG/1
600 TABLET ORAL ONCE
Status: COMPLETED | OUTPATIENT
Start: 2021-12-29 | End: 2021-12-29

## 2021-12-29 RX ORDER — ONDANSETRON 2 MG/ML
INJECTION INTRAMUSCULAR; INTRAVENOUS PRN
Status: DISCONTINUED | OUTPATIENT
Start: 2021-12-29 | End: 2021-12-29

## 2021-12-29 RX ORDER — ROCURONIUM BROMIDE 10 MG/ML
INJECTION, SOLUTION INTRAVENOUS PRN
Status: DISCONTINUED | OUTPATIENT
Start: 2021-12-29 | End: 2021-12-29

## 2021-12-29 RX ORDER — CELECOXIB 200 MG/1
200 CAPSULE ORAL ONCE
Status: COMPLETED | OUTPATIENT
Start: 2021-12-29 | End: 2021-12-29

## 2021-12-29 RX ORDER — NEOSTIGMINE METHYLSULFATE 1 MG/ML
INJECTION, SOLUTION INTRAVENOUS PRN
Status: DISCONTINUED | OUTPATIENT
Start: 2021-12-29 | End: 2021-12-29

## 2021-12-29 RX ORDER — GLYCOPYRROLATE 0.2 MG/ML
INJECTION, SOLUTION INTRAMUSCULAR; INTRAVENOUS PRN
Status: DISCONTINUED | OUTPATIENT
Start: 2021-12-29 | End: 2021-12-29

## 2021-12-29 RX ORDER — HYDROCODONE BITARTRATE AND ACETAMINOPHEN 5; 325 MG/1; MG/1
1 TABLET ORAL EVERY 6 HOURS PRN
Qty: 20 TABLET | Refills: 0 | Status: SHIPPED | OUTPATIENT
Start: 2021-12-29

## 2021-12-29 RX ORDER — PHENAZOPYRIDINE HYDROCHLORIDE 100 MG/1
TABLET, FILM COATED ORAL
Status: DISCONTINUED
Start: 2021-12-29 | End: 2021-12-29 | Stop reason: HOSPADM

## 2021-12-29 RX ORDER — SODIUM CHLORIDE, SODIUM LACTATE, POTASSIUM CHLORIDE, CALCIUM CHLORIDE 600; 310; 30; 20 MG/100ML; MG/100ML; MG/100ML; MG/100ML
INJECTION, SOLUTION INTRAVENOUS
Status: COMPLETED
Start: 2021-12-29 | End: 2021-12-29

## 2021-12-29 RX ORDER — BUPIVACAINE HYDROCHLORIDE 2.5 MG/ML
INJECTION, SOLUTION EPIDURAL; INFILTRATION; INTRACAUDAL PRN
Status: DISCONTINUED | OUTPATIENT
Start: 2021-12-29 | End: 2021-12-29

## 2021-12-29 RX ORDER — HYDROCODONE BITARTRATE AND ACETAMINOPHEN 5; 325 MG/1; MG/1
1 TABLET ORAL EVERY 4 HOURS PRN
Status: DISCONTINUED | OUTPATIENT
Start: 2021-12-29 | End: 2021-12-29 | Stop reason: HOSPADM

## 2021-12-29 RX ADMIN — HYDROMORPHONE HYDROCHLORIDE 0.2 MG: 1 INJECTION, SOLUTION INTRAMUSCULAR; INTRAVENOUS; SUBCUTANEOUS at 11:20

## 2021-12-29 RX ADMIN — FENTANYL CITRATE 25 MCG: 50 INJECTION INTRAMUSCULAR; INTRAVENOUS at 09:00

## 2021-12-29 RX ADMIN — FENTANYL CITRATE 25 MCG: 50 INJECTION INTRAMUSCULAR; INTRAVENOUS at 11:15

## 2021-12-29 RX ADMIN — HYDROMORPHONE HYDROCHLORIDE 0.2 MG: 1 INJECTION, SOLUTION INTRAMUSCULAR; INTRAVENOUS; SUBCUTANEOUS at 11:15

## 2021-12-29 RX ADMIN — BUPIVACAINE HYDROCHLORIDE 10 ML: 2.5 INJECTION, SOLUTION EPIDURAL; INFILTRATION; INTRACAUDAL at 08:23

## 2021-12-29 RX ADMIN — NEOSTIGMINE METHYLSULFATE 3.5 MG: 1 INJECTION INTRAVENOUS at 10:49

## 2021-12-29 RX ADMIN — PROPOFOL 200 MG: 10 INJECTION, EMULSION INTRAVENOUS at 08:37

## 2021-12-29 RX ADMIN — GLYCOPYRROLATE 0.6 MG: 0.2 INJECTION, SOLUTION INTRAMUSCULAR; INTRAVENOUS at 10:49

## 2021-12-29 RX ADMIN — MISOPROSTOL 600 MCG: 200 TABLET ORAL at 07:09

## 2021-12-29 RX ADMIN — DEXAMETHASONE SODIUM PHOSPHATE 4 MG: 4 INJECTION, SOLUTION INTRAMUSCULAR; INTRAVENOUS at 08:59

## 2021-12-29 RX ADMIN — HYDROMORPHONE HYDROCHLORIDE 0.2 MG: 1 INJECTION, SOLUTION INTRAMUSCULAR; INTRAVENOUS; SUBCUTANEOUS at 11:25

## 2021-12-29 RX ADMIN — FENTANYL CITRATE 25 MCG: 50 INJECTION INTRAMUSCULAR; INTRAVENOUS at 11:20

## 2021-12-29 RX ADMIN — ACETAMINOPHEN 650 MG: 325 TABLET ORAL at 07:00

## 2021-12-29 RX ADMIN — Medication 100 MG: at 08:37

## 2021-12-29 RX ADMIN — FENTANYL CITRATE 25 MCG: 50 INJECTION INTRAMUSCULAR; INTRAVENOUS at 11:25

## 2021-12-29 RX ADMIN — HYDROCODONE BITARTRATE AND ACETAMINOPHEN 1 TABLET: 5; 325 TABLET ORAL at 13:29

## 2021-12-29 RX ADMIN — SODIUM CHLORIDE, POTASSIUM CHLORIDE, SODIUM LACTATE AND CALCIUM CHLORIDE: 600; 310; 30; 20 INJECTION, SOLUTION INTRAVENOUS at 08:30

## 2021-12-29 RX ADMIN — LIDOCAINE HYDROCHLORIDE 2 ML: 20 INJECTION, SOLUTION INFILTRATION; PERINEURAL at 08:37

## 2021-12-29 RX ADMIN — SODIUM CHLORIDE, POTASSIUM CHLORIDE, SODIUM LACTATE AND CALCIUM CHLORIDE: 600; 310; 30; 20 INJECTION, SOLUTION INTRAVENOUS at 10:48

## 2021-12-29 RX ADMIN — HYDROMORPHONE HYDROCHLORIDE 0.2 MG: 1 INJECTION, SOLUTION INTRAMUSCULAR; INTRAVENOUS; SUBCUTANEOUS at 11:10

## 2021-12-29 RX ADMIN — ROCURONIUM BROMIDE 5 MG: 10 INJECTION INTRAVENOUS at 08:37

## 2021-12-29 RX ADMIN — BUPIVACAINE 20 ML: 13.3 INJECTION, SUSPENSION, LIPOSOMAL INFILTRATION at 08:23

## 2021-12-29 RX ADMIN — FENTANYL CITRATE 50 MCG: 50 INJECTION INTRAMUSCULAR; INTRAVENOUS at 10:49

## 2021-12-29 RX ADMIN — FENTANYL CITRATE 25 MCG: 50 INJECTION INTRAMUSCULAR; INTRAVENOUS at 11:10

## 2021-12-29 RX ADMIN — GABAPENTIN 300 MG: 100 CAPSULE ORAL at 07:03

## 2021-12-29 RX ADMIN — ROCURONIUM BROMIDE 35 MG: 10 INJECTION INTRAVENOUS at 08:46

## 2021-12-29 RX ADMIN — HYDROMORPHONE HYDROCHLORIDE 0.2 MG: 1 INJECTION, SOLUTION INTRAMUSCULAR; INTRAVENOUS; SUBCUTANEOUS at 11:30

## 2021-12-29 RX ADMIN — SODIUM CHLORIDE, POTASSIUM CHLORIDE, SODIUM LACTATE AND CALCIUM CHLORIDE: 600; 310; 30; 20 INJECTION, SOLUTION INTRAVENOUS at 06:29

## 2021-12-29 RX ADMIN — ONDANSETRON 4 MG: 2 INJECTION INTRAMUSCULAR; INTRAVENOUS at 10:50

## 2021-12-29 RX ADMIN — PHENAZOPYRIDINE HYDROCHLORIDE 200 MG: 100 TABLET ORAL at 07:02

## 2021-12-29 RX ADMIN — MIDAZOLAM HYDROCHLORIDE 2 MG: 1 INJECTION, SOLUTION INTRAMUSCULAR; INTRAVENOUS at 08:20

## 2021-12-29 RX ADMIN — CELECOXIB 200 MG: 200 CAPSULE ORAL at 07:03

## 2021-12-29 RX ADMIN — FENTANYL CITRATE 25 MCG: 50 INJECTION INTRAMUSCULAR; INTRAVENOUS at 08:37

## 2021-12-29 RX ADMIN — CEFAZOLIN SODIUM 2000 MG: 300 INJECTION, POWDER, LYOPHILIZED, FOR SOLUTION INTRAVENOUS at 08:53

## 2021-12-29 ASSESSMENT — ACTIVITIES OF DAILY LIVING (ADL)
HISTORY OF FALLING IN THE LAST YEAR (PRIOR TO ADMISSION): NO
ADL_SHORT_OF_BREATH: NO
SENSORY_SUPPORT_DEVICES: EYEGLASSES
ADL_BEFORE_ADMISSION: INDEPENDENT
ADL_SCORE: 12
NEEDS_ASSIST: NO
RECENT_DECLINE_ADL: NO
CHRONIC_PAIN_PRESENT: NO

## 2021-12-29 ASSESSMENT — COGNITIVE AND FUNCTIONAL STATUS - GENERAL
ARE YOU BLIND OR DO YOU HAVE SERIOUS DIFFICULTY SEEING, EVEN WHEN WEARING GLASSES: NO
ARE YOU DEAF OR DO YOU HAVE SERIOUS DIFFICULTY  HEARING: NO

## 2021-12-29 ASSESSMENT — PAIN SCALES - GENERAL
PAINLEVEL_OUTOF10: 0
PAINLEVEL_OUTOF10: 7
PAINLEVEL_OUTOF10: 6
PAINLEVEL_OUTOF10: 6
PAINLEVEL_OUTOF10: 2
PAINLEVEL_OUTOF10: 7

## 2021-12-30 ENCOUNTER — TELEPHONE (OUTPATIENT)
Dept: OBGYN | Age: 41
End: 2021-12-30

## 2021-12-30 LAB
ASR DISCLAIMER: NORMAL
CASE RPRT: NORMAL
CLINICAL INFO: NORMAL
PATH REPORT.FINAL DX SPEC: NORMAL
PATH REPORT.GROSS SPEC: NORMAL

## 2022-01-07 ENCOUNTER — OFFICE VISIT (OUTPATIENT)
Dept: OBGYN | Age: 42
End: 2022-01-07

## 2022-01-07 VITALS
RESPIRATION RATE: 16 BRPM | DIASTOLIC BLOOD PRESSURE: 67 MMHG | TEMPERATURE: 98.2 F | SYSTOLIC BLOOD PRESSURE: 106 MMHG | HEART RATE: 67 BPM

## 2022-01-07 DIAGNOSIS — Z98.890 POST-OPERATIVE STATE: Primary | ICD-10-CM

## 2022-01-07 PROCEDURE — 99024 POSTOP FOLLOW-UP VISIT: CPT | Performed by: OBSTETRICS & GYNECOLOGY

## 2022-01-07 ASSESSMENT — ENCOUNTER SYMPTOMS
CONSTIPATION: 0
APPETITE CHANGE: 0
COLOR CHANGE: 0
SHORTNESS OF BREATH: 0
DIARRHEA: 0
ABDOMINAL PAIN: 0
NAUSEA: 0
APNEA: 0
ACTIVITY CHANGE: 0

## 2022-01-11 ENCOUNTER — APPOINTMENT (OUTPATIENT)
Dept: OBGYN | Age: 42
End: 2022-01-11

## 2022-01-20 ENCOUNTER — TELEPHONE (OUTPATIENT)
Dept: OBGYN | Age: 42
End: 2022-01-20

## 2022-01-21 ENCOUNTER — OFFICE VISIT (OUTPATIENT)
Dept: OBGYN | Age: 42
End: 2022-01-21

## 2022-01-21 VITALS
OXYGEN SATURATION: 97 % | SYSTOLIC BLOOD PRESSURE: 91 MMHG | WEIGHT: 165.24 LBS | DIASTOLIC BLOOD PRESSURE: 62 MMHG | BODY MASS INDEX: 33.31 KG/M2 | RESPIRATION RATE: 16 BRPM | TEMPERATURE: 98.1 F | HEIGHT: 59 IN | HEART RATE: 66 BPM

## 2022-01-21 DIAGNOSIS — Z98.890 POST-OPERATIVE STATE: Primary | ICD-10-CM

## 2022-01-21 PROCEDURE — 99024 POSTOP FOLLOW-UP VISIT: CPT | Performed by: OBSTETRICS & GYNECOLOGY

## 2022-01-21 ASSESSMENT — PAIN SCALES - GENERAL: PAINLEVEL: 4

## 2022-01-27 ENCOUNTER — TELEPHONE (OUTPATIENT)
Dept: OBGYN | Age: 42
End: 2022-01-27

## 2022-01-28 ENCOUNTER — TELEPHONE (OUTPATIENT)
Dept: OBGYN | Age: 42
End: 2022-01-28

## 2022-01-28 DIAGNOSIS — N99.820 POSTOPERATIVE VAGINAL BLEEDING FOLLOWING GENITOURINARY PROCEDURE: Primary | ICD-10-CM

## 2022-01-29 ENCOUNTER — OFFICE VISIT (OUTPATIENT)
Dept: OBGYN | Age: 42
End: 2022-01-29

## 2022-01-29 ENCOUNTER — TELEPHONE (OUTPATIENT)
Dept: OBGYN | Age: 42
End: 2022-01-29

## 2022-01-29 ENCOUNTER — LAB SERVICES (OUTPATIENT)
Dept: LAB | Age: 42
End: 2022-01-29

## 2022-01-29 VITALS
SYSTOLIC BLOOD PRESSURE: 92 MMHG | WEIGHT: 165.34 LBS | RESPIRATION RATE: 16 BRPM | HEART RATE: 82 BPM | DIASTOLIC BLOOD PRESSURE: 59 MMHG | OXYGEN SATURATION: 97 % | BODY MASS INDEX: 33.4 KG/M2 | TEMPERATURE: 98.8 F

## 2022-01-29 DIAGNOSIS — Z09 POSTOPERATIVE EXAMINATION: Primary | ICD-10-CM

## 2022-01-29 DIAGNOSIS — N93.9 VAGINAL BLEEDING: ICD-10-CM

## 2022-01-29 DIAGNOSIS — Z90.710 S/P HYSTERECTOMY: ICD-10-CM

## 2022-01-29 DIAGNOSIS — N99.820 POSTOPERATIVE VAGINAL BLEEDING FOLLOWING GENITOURINARY PROCEDURE: ICD-10-CM

## 2022-01-29 DIAGNOSIS — R10.31 RLQ ABDOMINAL PAIN: ICD-10-CM

## 2022-01-29 LAB
DEPRECATED RDW RBC: 44.1 FL (ref 39–50)
ERYTHROCYTE [DISTWIDTH] IN BLOOD: 12.9 % (ref 11–15)
HCT VFR BLD CALC: 40.4 % (ref 36–46.5)
HGB BLD-MCNC: 13.6 G/DL (ref 12–15.5)
MCH RBC QN AUTO: 31.3 PG (ref 26–34)
MCHC RBC AUTO-ENTMCNC: 33.7 G/DL (ref 32–36.5)
MCV RBC AUTO: 93.1 FL (ref 78–100)
NRBC BLD MANUAL-RTO: 0 /100 WBC
PLATELET # BLD AUTO: 265 K/MCL (ref 140–450)
RBC # BLD: 4.34 MIL/MCL (ref 4–5.2)
WBC # BLD: 4.3 K/MCL (ref 4.2–11)

## 2022-01-29 PROCEDURE — 85027 COMPLETE CBC AUTOMATED: CPT | Performed by: PSYCHIATRY & NEUROLOGY

## 2022-01-29 PROCEDURE — 36415 COLL VENOUS BLD VENIPUNCTURE: CPT | Performed by: PSYCHIATRY & NEUROLOGY

## 2022-01-29 PROCEDURE — 99213 OFFICE O/P EST LOW 20 MIN: CPT | Performed by: OBSTETRICS & GYNECOLOGY

## 2022-01-29 ASSESSMENT — PATIENT HEALTH QUESTIONNAIRE - PHQ9: 2. FEELING DOWN, DEPRESSED OR HOPELESS: NOT AT ALL

## 2022-01-29 ASSESSMENT — PAIN SCALES - GENERAL: PAINLEVEL: 6

## 2022-02-01 ENCOUNTER — IMAGING SERVICES (OUTPATIENT)
Dept: ULTRASOUND IMAGING | Age: 42
End: 2022-02-01
Attending: OBSTETRICS & GYNECOLOGY

## 2022-02-01 DIAGNOSIS — N99.820 POSTOPERATIVE VAGINAL BLEEDING FOLLOWING GENITOURINARY PROCEDURE: ICD-10-CM

## 2022-02-01 PROCEDURE — 76830 TRANSVAGINAL US NON-OB: CPT | Performed by: RADIOLOGY

## 2022-02-01 PROCEDURE — 76856 US EXAM PELVIC COMPLETE: CPT | Performed by: RADIOLOGY

## 2022-02-02 ENCOUNTER — E-ADVICE (OUTPATIENT)
Dept: OBGYN | Age: 42
End: 2022-02-02

## 2022-02-07 ENCOUNTER — IMAGING SERVICES (OUTPATIENT)
Dept: CT IMAGING | Age: 42
End: 2022-02-07
Attending: OBSTETRICS & GYNECOLOGY

## 2022-02-07 ENCOUNTER — TELEPHONE (OUTPATIENT)
Dept: OBGYN | Age: 42
End: 2022-02-07

## 2022-02-07 DIAGNOSIS — N99.820 POSTOPERATIVE VAGINAL BLEEDING FOLLOWING GENITOURINARY PROCEDURE: ICD-10-CM

## 2022-02-07 PROCEDURE — G1004 CDSM NDSC: HCPCS | Performed by: RADIOLOGY

## 2022-02-07 PROCEDURE — 74177 CT ABD & PELVIS W/CONTRAST: CPT | Performed by: RADIOLOGY

## 2022-09-07 ENCOUNTER — OFFICE VISIT (OUTPATIENT)
Dept: FAMILY MEDICINE CLINIC | Facility: CLINIC | Age: 42
End: 2022-09-07
Payer: COMMERCIAL

## 2022-09-07 VITALS
HEART RATE: 66 BPM | WEIGHT: 168 LBS | DIASTOLIC BLOOD PRESSURE: 70 MMHG | SYSTOLIC BLOOD PRESSURE: 100 MMHG | BODY MASS INDEX: 34.79 KG/M2 | OXYGEN SATURATION: 98 % | TEMPERATURE: 98 F | HEIGHT: 58.39 IN

## 2022-09-07 DIAGNOSIS — Z13.21 SCREENING FOR ENDOCRINE, NUTRITIONAL, METABOLIC AND IMMUNITY DISORDER: ICD-10-CM

## 2022-09-07 DIAGNOSIS — Z00.00 ROUTINE GENERAL MEDICAL EXAMINATION AT A HEALTH CARE FACILITY: Primary | ICD-10-CM

## 2022-09-07 DIAGNOSIS — F41.9 ANXIETY: ICD-10-CM

## 2022-09-07 DIAGNOSIS — F32.1 CURRENT MODERATE EPISODE OF MAJOR DEPRESSIVE DISORDER WITHOUT PRIOR EPISODE (HCC): ICD-10-CM

## 2022-09-07 DIAGNOSIS — Z13.0 SCREENING FOR ENDOCRINE, NUTRITIONAL, METABOLIC AND IMMUNITY DISORDER: ICD-10-CM

## 2022-09-07 DIAGNOSIS — Z13.228 SCREENING FOR ENDOCRINE, NUTRITIONAL, METABOLIC AND IMMUNITY DISORDER: ICD-10-CM

## 2022-09-07 DIAGNOSIS — Z13.29 SCREENING FOR ENDOCRINE, NUTRITIONAL, METABOLIC AND IMMUNITY DISORDER: ICD-10-CM

## 2022-09-07 DIAGNOSIS — Z13.6 SCREENING FOR CARDIOVASCULAR CONDITION: ICD-10-CM

## 2022-09-07 DIAGNOSIS — Z12.31 ENCOUNTER FOR SCREENING MAMMOGRAM FOR MALIGNANT NEOPLASM OF BREAST: ICD-10-CM

## 2022-09-07 PROCEDURE — 3008F BODY MASS INDEX DOCD: CPT | Performed by: FAMILY MEDICINE

## 2022-09-07 PROCEDURE — 99214 OFFICE O/P EST MOD 30 MIN: CPT | Performed by: FAMILY MEDICINE

## 2022-09-07 PROCEDURE — 99396 PREV VISIT EST AGE 40-64: CPT | Performed by: FAMILY MEDICINE

## 2022-09-07 PROCEDURE — 3074F SYST BP LT 130 MM HG: CPT | Performed by: FAMILY MEDICINE

## 2022-09-07 PROCEDURE — 3078F DIAST BP <80 MM HG: CPT | Performed by: FAMILY MEDICINE

## 2022-09-07 RX ORDER — TRAZODONE HYDROCHLORIDE 50 MG/1
TABLET ORAL NIGHTLY PRN
Qty: 90 TABLET | Refills: 1 | Status: SHIPPED | OUTPATIENT
Start: 2022-09-07

## 2022-09-07 NOTE — PATIENT INSTRUCTIONS
--------------------------------------------------------------------    If labs ordered:  -- schedule appt for fasting bloodwork anytime that you are able to (fast for 8-10 hours minimum, no food. Water is fine). -- go to [employer] or use [employer] to schedule [facility]  -- call [employer] ([employer]) or use website to schedule labs if your insurance prefers [facility] (Always confirm your preferred lab with your insurance, and let us know if you need labs ordered at a specific location)  -- we will call with results about 5-7 days after bloodwork is completed    Always verify coverage of any testing or specialist referral with your insurance    Work on healthy nutrition:  -focus on plant based, low-fat proteins  -limit fatty, red, or processed meats  -decrease carbohydrates (bread, rice, pasta, tortillas, sweets, sodas, juice, energy drinks)  -eat more fruits and veggies  -1/2 of every meal should be fruits/veggies; 1/4 should be protein, only 1/4 should be carbohydrates  -can work on decreasing portion sizes with each meal and drink plenty of water with each meal   -eat slowly - the brain can take up to 20min to realize stomach is full (easier to overeat when you eat fast)  -try to eat consistently throughout the day - can use healthy proteins or fiber rich foods for snacks in between meals (nuts, oatmeal, fruits, veggies)  -can you calorie tracking apps (myfitness pal or similar) to everything you eat for up to 2 wks to get a sense of what you are eating    Increase exercise:  -goal is 20-30min of continuous cardio (increased heart-rate and sweating) for 3-4 times per week  -work your way slowly up to this  -can focus on low impact exercises (elliptical, cycling, swimming) if you have joint pains with walking/jogging/running    For sleep:  -make sure room is dark and quiet  -no reading, tv, phone, tablets, computers in bed - these can activate the brain over time and associate being awake with being in the bed  -if you are not sleeping, leave the bedroom, do any of the above until you are tired, then try again  -this will help reinforce with the brain the the bed is for sleeping  -consider melatonin 5-6mg nightly for 4-6 wks to help with sleep  -can use OTC benadryl or unisom as needed on top of this    Skin health:  -always use sunscreen (30+ spf) if out in the sun for longer than 10-15min  -cover up if needed  -reapply sunscreen every 2 hours  -consider seeing a dermatologist for a full skin exam every 1-2 years if you have had a lot of sun exposure in your life or if you have a lot of moles

## 2022-09-15 ENCOUNTER — HOSPITAL ENCOUNTER (OUTPATIENT)
Dept: MAMMOGRAPHY | Age: 42
Discharge: HOME | End: 2022-09-15
Attending: FAMILY MEDICINE
Payer: COMMERCIAL

## 2022-09-15 DIAGNOSIS — Z12.31 ENCOUNTER FOR SCREENING MAMMOGRAM FOR MALIGNANT NEOPLASM OF BREAST: ICD-10-CM

## 2022-09-15 PROCEDURE — 77063 BREAST TOMOSYNTHESIS BI: CPT | Performed by: FAMILY MEDICINE

## 2022-09-15 PROCEDURE — 77067 SCR MAMMO BI INCL CAD: CPT | Performed by: FAMILY MEDICINE

## 2022-09-27 ENCOUNTER — HOSPITAL ENCOUNTER (OUTPATIENT)
Dept: ULTRASOUND IMAGING | Age: 42
Discharge: HOME | End: 2022-09-27
Attending: FAMILY MEDICINE

## 2022-09-27 ENCOUNTER — HOSPITAL ENCOUNTER (OUTPATIENT)
Dept: MAMMOGRAPHY | Age: 42
Discharge: HOME | End: 2022-09-27
Attending: FAMILY MEDICINE

## 2022-09-27 DIAGNOSIS — R92.2 INCONCLUSIVE MAMMOGRAM: ICD-10-CM

## 2022-09-27 PROCEDURE — 76642 ULTRASOUND BREAST LIMITED: CPT | Performed by: FAMILY MEDICINE

## 2022-09-27 PROCEDURE — 77065 DX MAMMO INCL CAD UNI: CPT | Performed by: FAMILY MEDICINE

## 2022-09-27 PROCEDURE — 77061 BREAST TOMOSYNTHESIS UNI: CPT | Performed by: FAMILY MEDICINE

## 2023-03-13 PROBLEM — E66.09 CLASS 2 OBESITY DUE TO EXCESS CALORIES WITHOUT SERIOUS COMORBIDITY WITH BODY MASS INDEX (BMI) OF 35.0 TO 35.9 IN ADULT: Status: ACTIVE | Noted: 2017-05-31

## 2023-03-13 PROBLEM — E66.812 CLASS 2 OBESITY DUE TO EXCESS CALORIES WITHOUT SERIOUS COMORBIDITY WITH BODY MASS INDEX (BMI) OF 35.0 TO 35.9 IN ADULT: Status: ACTIVE | Noted: 2017-05-31

## 2023-03-28 ENCOUNTER — HOSPITAL ENCOUNTER (OUTPATIENT)
Dept: MAMMOGRAPHY | Age: 43
Discharge: HOME | End: 2023-03-28
Attending: FAMILY MEDICINE
Payer: COMMERCIAL

## 2023-03-28 DIAGNOSIS — R92.8 ABNORMAL MAMMOGRAM: ICD-10-CM

## 2023-03-28 PROCEDURE — 77065 DX MAMMO INCL CAD UNI: CPT | Performed by: FAMILY MEDICINE

## 2023-03-28 PROCEDURE — 77061 BREAST TOMOSYNTHESIS UNI: CPT | Performed by: FAMILY MEDICINE

## 2023-05-01 ENCOUNTER — PATIENT MESSAGE (OUTPATIENT)
Dept: FAMILY MEDICINE CLINIC | Facility: CLINIC | Age: 43
End: 2023-05-01

## 2023-05-02 ENCOUNTER — TELEPHONE (OUTPATIENT)
Dept: FAMILY MEDICINE CLINIC | Facility: CLINIC | Age: 43
End: 2023-05-02

## 2023-05-02 NOTE — TELEPHONE ENCOUNTER
Last OV 3/13/23  3.  Class 2 obesity due to excess calories without serious comorbidity with body mass index (BMI) of 35.0 to 35.9 in adult  -counseled on continued lifestyle changes  -start metformin  -risks and side effects of med discussed, patient expressed understanding  -f/u in 3 months, sooner prn

## 2023-06-10 ENCOUNTER — OFFICE VISIT (OUTPATIENT)
Dept: FAMILY MEDICINE CLINIC | Facility: CLINIC | Age: 43
End: 2023-06-10
Payer: COMMERCIAL

## 2023-06-10 VITALS
TEMPERATURE: 97 F | BODY MASS INDEX: 33.1 KG/M2 | HEART RATE: 68 BPM | DIASTOLIC BLOOD PRESSURE: 60 MMHG | OXYGEN SATURATION: 98 % | HEIGHT: 58.58 IN | WEIGHT: 162 LBS | SYSTOLIC BLOOD PRESSURE: 98 MMHG

## 2023-06-10 DIAGNOSIS — F33.1 MAJOR DEPRESSIVE DISORDER, RECURRENT, MODERATE (HCC): ICD-10-CM

## 2023-06-10 DIAGNOSIS — E66.09 CLASS 2 OBESITY DUE TO EXCESS CALORIES WITHOUT SERIOUS COMORBIDITY WITH BODY MASS INDEX (BMI) OF 35.0 TO 35.9 IN ADULT: Primary | ICD-10-CM

## 2023-06-10 DIAGNOSIS — F41.9 ANXIETY: ICD-10-CM

## 2023-06-10 PROCEDURE — 99214 OFFICE O/P EST MOD 30 MIN: CPT | Performed by: FAMILY MEDICINE

## 2023-06-10 PROCEDURE — 3078F DIAST BP <80 MM HG: CPT | Performed by: FAMILY MEDICINE

## 2023-06-10 PROCEDURE — 3008F BODY MASS INDEX DOCD: CPT | Performed by: FAMILY MEDICINE

## 2023-06-10 PROCEDURE — 3074F SYST BP LT 130 MM HG: CPT | Performed by: FAMILY MEDICINE

## 2023-06-10 NOTE — PATIENT INSTRUCTIONS
Continue ozempic 0.5 weekly     If weight loss slows down at all, let me know    Followup in 3 months

## 2023-08-23 ENCOUNTER — TELEMEDICINE (OUTPATIENT)
Dept: FAMILY MEDICINE CLINIC | Facility: CLINIC | Age: 43
End: 2023-08-23
Payer: COMMERCIAL

## 2023-08-23 DIAGNOSIS — N63.11 MASS OF UPPER OUTER QUADRANT OF RIGHT BREAST: Primary | ICD-10-CM

## 2023-08-23 PROCEDURE — 99214 OFFICE O/P EST MOD 30 MIN: CPT | Performed by: FAMILY MEDICINE

## 2023-08-23 NOTE — PROGRESS NOTES
Virtual/Telephone Check-In    Miriam Go is a 44year old female here today for a telemedicine audio and video visit. HPI:       1. Mass of upper outer quadrant of right breast  -noted a lump in right outer breast   -never noted this before  -has been eating better and losing weight  -interested in further eval      ASSESSMENT/PLAN:     1. Mass of upper outer quadrant of right breast  -check mammo and US in that area  -there is note of a focal asymmetry in that area that had been stable  -wants to make sure not growing    - AMBREEN KATEY 2D+3D DIAGNOSTIC AMBREEN  BILAT (CPT=77066/83883); Future  - US BREAST RIGHT COMPLETE (AIR=[id number]); Future        Orders This Visit:  No orders of the defined types were placed in this encounter. Meds This Visit:  Requested Prescriptions      No prescriptions requested or ordered in this encounter       Imaging & Referrals:  AMBREEN KATEY 2D+3D DIAGNOSTIC AMBREEN  BILAT (CPT=77066/02839)  US BREAST RIGHT COMPLETE (WOZ=[id number])       PHYSICAL EXAM:     Patient Reported Vitals                             Gen: NAD, alert and oriented x 3, able to speak in full sentences  Pulm: No labored breathing or appreciable shortness of breath, no coughing during duration of visit  Psych: normal affect      HISTORY:       Past Medical History:   Diagnosis Date    Anxiety     Depression 2021    Obesity 3/13/2023      Past Surgical History:   Procedure Laterality Date    HYSTERECTOMY  2021    LAP GASTRIC BYPASS/CONSTANCE-EN-Y        1999      Family History   Problem Relation Age of Onset    Diabetes Maternal Grandmother     Other (Other [Other]) Paternal Grandfather         Liver disease      Social History:   Social History     Socioeconomic History    Marital status:    Tobacco Use    Smoking status: Never    Smokeless tobacco: Never   Vaping Use    Vaping Use: Never used   Substance and Sexual Activity    Alcohol use:  Yes     Alcohol/week: 1.0 standard drink of alcohol     Types: 1 Standard drinks or equivalent per week     Comment: Socially    Drug use: No    Sexual activity: Yes     Partners: Male     Birth control/protection: Hysterectomy   Other Topics Concern    Caffeine Concern Yes     Comment: 1 cup of coffee daily    Stress Concern No    Weight Concern Yes     Comment: Not able to lose weight    Special Diet No    Exercise Yes     Comment: 5-6 x weekly    Seat Belt Yes          Medications (Active prior to today's visit):  Current Outpatient Medications   Medication Sig Dispense Refill    semaglutide 2 MG/3ML Subcutaneous Solution Pen-injector Inject 0.5 mg into the skin once a week. 9 mL 1    traZODone 50 MG Oral Tab Take 0.5-1 tablets (25-50 mg total) by mouth nightly as needed for Sleep. 90 tablet 1    Multiple Vitamin (MULTI-VITAMIN) Oral Tab Take 1 tablet by mouth daily. Allergies:    Shellfish-Derived P*    HIVES  Adhesive Tape (Geeta*    RASH  Latex                   RASH      ROS:   --See HPI for relevant ROS    --GEN: No other complaints  --HEENT: No other complaints  --RESP: No other complaints  --CV: No other complaints  --GI: No other complaints  --: No other complaints  --MSK: No other complaints  All other systems reviewed are negative      Alix Valdez MD     Telehealth outside of [address] Verbal Consent   I conducted a telehealth visit with Barbara Lopez today, 08/23/23, which was completed using two-way, real-time interactive audio and video communication. This has been done in good tonie to provide continuity of care in the best interest of the provider-patient relationship, due to the COVID -19 public health crisis/national emergency where restrictions of face-to-face office visits are ongoing. Every conscious effort was taken to allow for sufficient and adequate time to complete the visit. The patient was made aware of the limitations of the telehealth visit, including treatment limitations as no physical exam could be performed.   The patient was advised to call 911 or to go to the ER in case there was an emergency. The patient was also advised of the potential privacy & security concerns related to the telehealth platform. The patient was made aware of where to find [facility] notice of privacy practices, telehealth consent form and other related consent forms and documents. which are located on the [facility] website. The patient verbally agreed to telehealth consent form, related consents and the risks discussed. Lastly, the patient confirmed that they were in [place]. Included in this visit, time may have been spent reviewing labs, medications, radiology tests and decision making. Appropriate medical decision-making and tests are ordered as detailed in the plan of care above. Coding/billing information is submitted for this visit based on complexity of care and/or time spent for the visit. This visit is conducted using Telemedicine with live, interactive video and audio. Patient has been referred to the [facility] website at [email or web address] to review the yearly Consent to Treat document. Patient understands and accepts financial responsibility for any deductible, co-insurance and/or co-pays associated with this service.         Duration of the service: 30 min

## 2023-08-25 ENCOUNTER — HOSPITAL ENCOUNTER (OUTPATIENT)
Dept: MAMMOGRAPHY | Facility: HOSPITAL | Age: 43
Discharge: HOME | End: 2023-08-25
Attending: FAMILY MEDICINE
Payer: COMMERCIAL

## 2023-08-25 DIAGNOSIS — N63.11 MASS OF UPPER OUTER QUADRANT OF RIGHT BREAST: ICD-10-CM

## 2023-08-25 PROCEDURE — 77066 DX MAMMO INCL CAD BI: CPT | Performed by: FAMILY MEDICINE

## 2023-08-25 PROCEDURE — 76642 ULTRASOUND BREAST LIMITED: CPT | Performed by: FAMILY MEDICINE

## 2023-08-25 PROCEDURE — 77062 BREAST TOMOSYNTHESIS BI: CPT | Performed by: FAMILY MEDICINE

## 2023-08-25 NOTE — IMAGING NOTE
Lauren \"Dane\" Rut Breath is recommended for an ultrasound guided biopsy of the right breast by . History Mass of upper outer quadrant of right breast   Findings-Targeted ultrasound in this part of the breast or patient has palpable concern demonstrates mostly hyperechoic fairly well-circumscribed mass which measures 4.9 x 1.2 x 5.4 cm. Imaging features by ultrasound are less specific, however, do correlate well    with the mammographic finding. Additional evaluation with ultrasound-guided biopsy of this finding will be recommended. Recommendation-ULTRASOUND-GUIDED BIOPSY: RIGHT BREAST     See EMR for complete imaging report    Medications and allergies reviewed:  Current Outpatient Medications   Medication Sig Dispense Refill    semaglutide 2 MG/3ML Subcutaneous Solution Pen-injector Inject 0.5 mg into the skin once a week. 9 mL 1    traZODone 50 MG Oral Tab Take 0.5-1 tablets (25-50 mg total) by mouth nightly as needed for Sleep. 90 tablet 1    Multiple Vitamin (MULTI-VITAMIN) Oral Tab Take 1 tablet by mouth daily. The following allergies were reported  Shellfish-derived ProductsHIVES  Adhesive Tape (Rosins)MIKHAIL Hammond    Lauren \"Dane\" Rut Breath denies the use of prescribed anticoagulants, denies known bleeding disorders and/or liver disease, denies chemotherapy    Procedure explained and questions answered. Sadie Norton provided with written educational material.     Ms. Rut Delgado instructed to take 1000 mg of acetaminophen on the day of the biopsy, eat a light meal, and bring or wear a sport bra. Post biopsy care and instruction reviewed: including no lifting more than five pounds, no upper body exercise, icing of biopsy site, no submerging in water. Lauren \"Dane\" Rut Delgado verbalized understanding. Ms. Rut Delgado provided with an appointment at [facility] women's imaging center Wednesday, August 30 at 0830. Appointment confirmed with breast center .

## 2023-08-28 ENCOUNTER — PATIENT MESSAGE (OUTPATIENT)
Dept: FAMILY MEDICINE CLINIC | Facility: CLINIC | Age: 43
End: 2023-08-28

## 2023-08-28 ENCOUNTER — TELEPHONE (OUTPATIENT)
Dept: FAMILY MEDICINE CLINIC | Facility: CLINIC | Age: 43
End: 2023-08-28

## 2023-08-28 RX ORDER — DIAZEPAM 5 MG/1
TABLET ORAL
Qty: 4 TABLET | Refills: 0 | Status: SHIPPED | OUTPATIENT
Start: 2023-08-28

## 2023-08-30 ENCOUNTER — HOSPITAL ENCOUNTER (OUTPATIENT)
Dept: MAMMOGRAPHY | Facility: HOSPITAL | Age: 43
Discharge: HOME | End: 2023-08-30
Attending: FAMILY MEDICINE
Payer: COMMERCIAL

## 2023-08-30 DIAGNOSIS — N63.0 BREAST MASS: ICD-10-CM

## 2023-08-30 PROCEDURE — 88342 IMHCHEM/IMCYTCHM 1ST ANTB: CPT | Performed by: FAMILY MEDICINE

## 2023-08-30 PROCEDURE — 88305 TISSUE EXAM BY PATHOLOGIST: CPT | Performed by: FAMILY MEDICINE

## 2023-08-30 PROCEDURE — 77065 DX MAMMO INCL CAD UNI: CPT | Performed by: FAMILY MEDICINE

## 2023-08-30 PROCEDURE — 88341 IMHCHEM/IMCYTCHM EA ADD ANTB: CPT | Performed by: FAMILY MEDICINE

## 2023-08-30 PROCEDURE — 19083 BX BREAST 1ST LESION US IMAG: CPT | Performed by: FAMILY MEDICINE

## 2023-09-05 ENCOUNTER — TELEPHONE (OUTPATIENT)
Dept: GENERAL RADIOLOGY | Facility: HOSPITAL | Age: 43
End: 2023-09-05

## 2023-09-06 ENCOUNTER — TELEPHONE (OUTPATIENT)
Dept: MAMMOGRAPHY | Facility: HOSPITAL | Age: 43
End: 2023-09-06

## 2023-09-06 NOTE — TELEPHONE ENCOUNTER
Telephoned [employer] and name,  verified with patient. Notified [employer] of right breast 1 site negative for malignancy biopsy result. [facility] verified by radiologist, Dr. Melvi Recinos. [address] reports biopsy site is healing well. Radiologist recommends clinical follow up and if symptomatic, surgical consultation is recommended. Pt verbalized understanding and had no further questions at this time.

## 2023-09-09 ENCOUNTER — OFFICE VISIT (OUTPATIENT)
Dept: FAMILY MEDICINE CLINIC | Facility: CLINIC | Age: 43
End: 2023-09-09
Payer: COMMERCIAL

## 2023-09-09 VITALS
BODY MASS INDEX: 31.46 KG/M2 | WEIGHT: 154 LBS | HEIGHT: 58.66 IN | SYSTOLIC BLOOD PRESSURE: 100 MMHG | DIASTOLIC BLOOD PRESSURE: 60 MMHG | HEART RATE: 78 BPM | TEMPERATURE: 97 F | OXYGEN SATURATION: 97 %

## 2023-09-09 DIAGNOSIS — Z00.00 ROUTINE GENERAL MEDICAL EXAMINATION AT A HEALTH CARE FACILITY: Primary | ICD-10-CM

## 2023-09-09 DIAGNOSIS — N63.11 LUMP IN UPPER OUTER QUADRANT OF RIGHT BREAST: ICD-10-CM

## 2023-09-09 DIAGNOSIS — F41.9 ANXIETY: ICD-10-CM

## 2023-09-09 DIAGNOSIS — F33.1 MAJOR DEPRESSIVE DISORDER, RECURRENT, MODERATE (HCC): ICD-10-CM

## 2023-09-09 DIAGNOSIS — E66.09 CLASS 2 OBESITY DUE TO EXCESS CALORIES WITHOUT SERIOUS COMORBIDITY WITH BODY MASS INDEX (BMI) OF 35.0 TO 35.9 IN ADULT: ICD-10-CM

## 2023-09-09 PROCEDURE — 99214 OFFICE O/P EST MOD 30 MIN: CPT | Performed by: FAMILY MEDICINE

## 2023-09-09 PROCEDURE — 3008F BODY MASS INDEX DOCD: CPT | Performed by: FAMILY MEDICINE

## 2023-09-09 PROCEDURE — 3074F SYST BP LT 130 MM HG: CPT | Performed by: FAMILY MEDICINE

## 2023-09-09 PROCEDURE — 99396 PREV VISIT EST AGE 40-64: CPT | Performed by: FAMILY MEDICINE

## 2023-09-09 PROCEDURE — 3078F DIAST BP <80 MM HG: CPT | Performed by: FAMILY MEDICINE

## 2023-09-09 RX ORDER — FLUTICASONE PROPIONATE 50 MCG
2 SPRAY, SUSPENSION (ML) NASAL DAILY
Qty: 16 G | Refills: 2 | Status: SHIPPED | OUTPATIENT
Start: 2023-09-09 | End: 2023-12-08

## 2023-09-09 NOTE — PATIENT INSTRUCTIONS
--------------------------------------------------------------------    If labs ordered:  -- schedule appt for fasting bloodwork anytime that you are able to (fast for 8-10 hours minimum, no food. Water is fine). -- go to "[employer]" or use [employer] to schedule [facility]  -- call [email or web address] or use website to schedule labs if your insurance prefers [facility] (Always confirm your preferred lab with your insurance, and let us know if you need labs ordered at a specific location)  -- we will call with results about 5-7 days after bloodwork is completed    Always verify coverage of any testing or specialist referral with your insurance    Work on healthy nutrition:  -focus on plant based, low-fat proteins  -limit fatty, red, or processed meats  -decrease carbohydrates (bread, rice, pasta, tortillas, sweets, sodas, juice, energy drinks)  -eat more fruits and veggies  -1/2 of every meal should be fruits/veggies; 1/4 should be protein, only 1/4 should be carbohydrates  -can work on decreasing portion sizes with each meal and drink plenty of water with each meal   -eat slowly - the brain can take up to 20min to realize stomach is full (easier to overeat when you eat fast)  -try to eat consistently throughout the day - can use healthy proteins or fiber rich foods for snacks in between meals (nuts, oatmeal, fruits, veggies)  -can you calorie tracking apps (myfitness pal or similar) to everything you eat for up to 2 wks to get a sense of what you are eating    Increase exercise:  -goal is 20-30min of continuous cardio (increased heart-rate and sweating) for 3-4 times per week  -work your way slowly up to this  -can focus on low impact exercises (elliptical, cycling, swimming) if you have joint pains with walking/jogging/running    For sleep:  -make sure room is dark and quiet  -no reading, tv, phone, tablets, computers in bed - these can activate the brain over time and associate being awake with being in the bed  -if you are not sleeping, leave the bedroom, do any of the above until you are tired, then try again  -this will help reinforce with the brain the the bed is for sleeping  -consider melatonin 5-6mg nightly for 4-6 wks to help with sleep  -can use OTC benadryl or unisom as needed on top of this    Skin health:  -always use sunscreen (30+ spf) if out in the sun for longer than 10-15min  -cover up if needed  -reapply sunscreen every 2 hours  -consider seeing a dermatologist for a full skin exam every 1-2 years if you have had a lot of sun exposure in your life or if you have a lot of moles

## 2023-09-18 ENCOUNTER — TELEPHONE (OUTPATIENT)
Dept: SURGERY | Facility: CLINIC | Age: 43
End: 2023-09-18

## 2023-09-19 ENCOUNTER — OFFICE VISIT (OUTPATIENT)
Dept: SURGERY | Facility: CLINIC | Age: 43
End: 2023-09-19
Payer: COMMERCIAL

## 2023-09-19 VITALS
DIASTOLIC BLOOD PRESSURE: 76 MMHG | SYSTOLIC BLOOD PRESSURE: 113 MMHG | TEMPERATURE: 97 F | RESPIRATION RATE: 16 BRPM | HEART RATE: 71 BPM | WEIGHT: 150.81 LBS | BODY MASS INDEX: 30.4 KG/M2 | OXYGEN SATURATION: 100 % | HEIGHT: 59 IN

## 2023-09-19 DIAGNOSIS — N64.89 PSEUDOANGIOMATOUS STROMAL HYPERPLASIA OF BREAST: Primary | ICD-10-CM

## 2023-09-19 PROCEDURE — 3008F BODY MASS INDEX DOCD: CPT | Performed by: SURGERY

## 2023-09-19 PROCEDURE — 3078F DIAST BP <80 MM HG: CPT | Performed by: SURGERY

## 2023-09-19 PROCEDURE — 99204 OFFICE O/P NEW MOD 45 MIN: CPT | Performed by: SURGERY

## 2023-09-19 PROCEDURE — 3074F SYST BP LT 130 MM HG: CPT | Performed by: SURGERY

## 2024-03-20 ENCOUNTER — TELEPHONE (OUTPATIENT)
Dept: FAMILY MEDICINE CLINIC | Facility: CLINIC | Age: 44
End: 2024-03-20

## 2024-03-20 NOTE — TELEPHONE ENCOUNTER
Patient scheduled an appointment thru My-Chart for 4/3/24 and stated in the appointment notes Pain in upper left side of abdomen . Please advise if patient is OK to wait for appointment or needs to be seen sooner.     Future Appointments   Date Time Provider Department Center   4/3/2024  9:00 AM Chad Fallon MD [facility] [place]   6/8/2024 10:00 AM Chad Fallon MD [facility] [place]

## 2024-03-28 ENCOUNTER — HOSPITAL ENCOUNTER (OUTPATIENT)
Age: 44
Discharge: HOME | End: 2024-03-28
Attending: EMERGENCY MEDICINE
Payer: COMMERCIAL

## 2024-03-28 ENCOUNTER — APPOINTMENT (OUTPATIENT)
Dept: CT IMAGING | Age: 44
End: 2024-03-28
Attending: PHYSICIAN ASSISTANT
Payer: COMMERCIAL

## 2024-03-28 VITALS
TEMPERATURE: 97 F | WEIGHT: 140 LBS | HEIGHT: 59 IN | HEART RATE: 64 BPM | OXYGEN SATURATION: 100 % | SYSTOLIC BLOOD PRESSURE: 120 MMHG | BODY MASS INDEX: 28.22 KG/M2 | DIASTOLIC BLOOD PRESSURE: 89 MMHG | RESPIRATION RATE: 16 BRPM

## 2024-03-28 DIAGNOSIS — R10.12 ABDOMINAL PAIN, LEFT UPPER QUADRANT: Primary | ICD-10-CM

## 2024-03-28 DIAGNOSIS — K52.9 COLITIS: ICD-10-CM

## 2024-03-28 LAB
#MXD IC: 0.4 X10ˆ3/UL (ref 0.1–1)
B-HCG UR QL: NEGATIVE
BUN BLD-MCNC: 12 MG/DL (ref 7–18)
CHLORIDE BLD-SCNC: 104 MMOL/L (ref 98–112)
CLARITY UR: CLEAR
CO2 BLD-SCNC: 25 MMOL/L (ref 21–32)
COLOR UR: YELLOW
CREAT BLD-MCNC: 0.6 MG/DL
EGFRCR SERPLBLD CKD-EPI 2021: 115 ML/MIN/1.73M2 (ref 60–?)
GLUCOSE BLD-MCNC: 77 MG/DL (ref 70–99)
GLUCOSE UR STRIP-MCNC: NEGATIVE MG/DL
HCT VFR BLD AUTO: 38.8 %
HCT VFR BLD CALC: 41 %
HGB BLD-MCNC: 13.1 G/DL
HGB UR QL STRIP: NEGATIVE
ISTAT IONIZED CALCIUM FOR CHEM 8: 1.13 MMOL/L (ref 1.12–1.32)
KETONES UR STRIP-MCNC: 40 MG/DL
LEUKOCYTE ESTERASE UR QL STRIP: NEGATIVE
LYMPHOCYTES # BLD AUTO: 2.3 X10ˆ3/UL (ref 1–4)
LYMPHOCYTES NFR BLD AUTO: 41.6 %
MCH RBC QN AUTO: 30.7 PG (ref 26–34)
MCHC RBC AUTO-ENTMCNC: 33.8 G/DL (ref 31–37)
MCV RBC AUTO: 90.9 FL (ref 80–100)
MIXED CELL %: 7.6 %
NEUTROPHILS # BLD AUTO: 2.8 X10ˆ3/UL (ref 1.5–7.7)
NEUTROPHILS NFR BLD AUTO: 50.8 %
NITRITE UR QL STRIP: NEGATIVE
PH UR STRIP: 6 [PH]
PLATELET # BLD AUTO: 269 X10ˆ3/UL (ref 150–450)
POTASSIUM BLD-SCNC: 4 MMOL/L (ref 3.6–5.1)
PROT UR STRIP-MCNC: NEGATIVE MG/DL
RBC # BLD AUTO: 4.27 X10ˆ6/UL
SODIUM BLD-SCNC: 141 MMOL/L (ref 136–145)
SP GR UR STRIP: >=1.03
UROBILINOGEN UR STRIP-ACNC: <2 MG/DL
WBC # BLD AUTO: 5.5 X10ˆ3/UL (ref 4–11)

## 2024-03-28 PROCEDURE — 99215 OFFICE O/P EST HI 40 MIN: CPT

## 2024-03-28 PROCEDURE — 81002 URINALYSIS NONAUTO W/O SCOPE: CPT

## 2024-03-28 PROCEDURE — 85025 COMPLETE CBC W/AUTO DIFF WBC: CPT | Performed by: PHYSICIAN ASSISTANT

## 2024-03-28 PROCEDURE — 96374 THER/PROPH/DIAG INJ IV PUSH: CPT

## 2024-03-28 PROCEDURE — 99214 OFFICE O/P EST MOD 30 MIN: CPT

## 2024-03-28 PROCEDURE — 74177 CT ABD & PELVIS W/CONTRAST: CPT | Performed by: PHYSICIAN ASSISTANT

## 2024-03-28 PROCEDURE — 81025 URINE PREGNANCY TEST: CPT

## 2024-03-28 PROCEDURE — 80047 BASIC METABLC PNL IONIZED CA: CPT

## 2024-03-28 PROCEDURE — 96361 HYDRATE IV INFUSION ADD-ON: CPT

## 2024-03-28 RX ORDER — SODIUM CHLORIDE 9 MG/ML
1000 INJECTION, SOLUTION INTRAVENOUS ONCE
Status: COMPLETED | OUTPATIENT
Start: 2024-03-28 | End: 2024-03-28

## 2024-03-28 RX ORDER — DICYCLOMINE HCL 20 MG
20 TABLET ORAL 4 TIMES DAILY PRN
Qty: 30 TABLET | Refills: 0 | Status: SHIPPED | OUTPATIENT
Start: 2024-03-28 | End: 2024-04-27

## 2024-03-28 RX ORDER — ONDANSETRON 4 MG/1
4 TABLET, ORALLY DISINTEGRATING ORAL EVERY 4 HOURS PRN
Qty: 30 TABLET | Refills: 0 | Status: SHIPPED | OUTPATIENT
Start: 2024-03-28

## 2024-03-28 RX ORDER — PANTOPRAZOLE SODIUM 40 MG/1
40 TABLET, DELAYED RELEASE ORAL DAILY
COMMUNITY
Start: 2024-03-15

## 2024-03-28 RX ORDER — KETOROLAC TROMETHAMINE 30 MG/ML
15 INJECTION, SOLUTION INTRAMUSCULAR; INTRAVENOUS ONCE
Status: COMPLETED | OUTPATIENT
Start: 2024-03-28 | End: 2024-03-28

## 2024-03-28 NOTE — ED PROVIDER NOTES
Patient Seen in: [facility]      History     Chief Complaint   Patient presents with    Abdominal Pain     Abdominal pain upper left hand side - Entered by patient    Nausea/Vomiting/Diarrhea     Stated Complaint: Abdominal Pain - Abdominal pain  left hand side    Subjective:   HPI    43-year-old female here with complaint of left lower/upper quadrant pain for the past approximately 2 weeks.  Patient did a telehealth visit in started taking omeprazole with no relief.  Patient had gastric bypass in 2018.  Patient denies any recent antibiotic use or travels.  Patient denies chest pain, shortness of breath, cough.  Patient denies dysuria, hematuria or flank pain.  Patient has had intermittent nausea vomiting and diarrhea for the past 2 weeks.  Afebrile.    Objective:   Past Medical History:   Diagnosis Date    Anxiety     Depression 2021    Nasal congestion     Obesity 3/13/2023              Past Surgical History:   Procedure Laterality Date    HYSTERECTOMY  2021    LAP GASTRIC BYPASS/CONSTANCE-EN-Y        1999              The patient's medication list, past medical history and social history elements  as listed in today's nurse's notes are reviewed and agree.   The patient's family history is reviewed and is noncontributory to the presenting problem, except as indicated as above.     Social History     Socioeconomic History    Marital status:    Tobacco Use    Smoking status: Never    Smokeless tobacco: Never   Vaping Use    Vaping Use: Never used   Substance and Sexual Activity    Alcohol use: Yes     Alcohol/week: 1.0 standard drink of alcohol     Types: 1 Standard drinks or equivalent per week     Comment: Socially    Drug use: No    Sexual activity: Yes     Partners: Male     Birth control/protection: Hysterectomy   Other Topics Concern    Caffeine Concern Yes     Comment: 1 cup of coffee daily    Stress Concern No    Weight Concern Yes     Comment: Not able to lose weight     Special Diet No    Exercise Yes     Comment: 6-7 x weekly    Seat Belt Yes              Review of Systems    Positive for stated complaint: Abdominal Pain - Abdominal pain upper left hand side  Other systems are as noted in HPI.  Constitutional and vital signs reviewed.      All other systems reviewed and negative except as noted above.    Physical Exam     ED Triage Vitals [03/28/24 1616]   /89   Pulse 64   Resp 16   Temp 97.4 °F (36.3 °C)   Temp src Temporal   SpO2 100 %   O2 Device None (Room air)       Current:/89   Pulse 64   Temp 97.4 °F (36.3 °C) (Temporal)   Resp 16   Ht 149.9 cm (4' 11\")   Wt 63.5 kg   LMP 10/19/2021   SpO2 100%   BMI 28.28 kg/m²         Physical Exam  Vitals and nursing note reviewed.   Constitutional:       Appearance: She is well-developed.   HENT:      Head: Normocephalic.      Right Ear: External ear normal.      Left Ear: External ear normal.      Nose: Nose normal.      Mouth/Throat:      Mouth: Mucous membranes are moist.   Eyes:      Extraocular Movements: Extraocular movements intact.      Conjunctiva/sclera: Conjunctivae normal.      Pupils: Pupils are equal, round, and reactive to light.   Cardiovascular:      Rate and Rhythm: Normal rate and regular rhythm.      Heart sounds: Normal heart sounds.   Pulmonary:      Effort: Pulmonary effort is normal.      Breath sounds: Normal breath sounds.   Abdominal:      General: Abdomen is protuberant. Bowel sounds are normal.      Palpations: Abdomen is soft.      Tenderness: There is abdominal tenderness in the left upper quadrant.   Musculoskeletal:      Cervical back: Normal range of motion and neck supple.   Skin:     General: Skin is warm.      Capillary Refill: Capillary refill takes less than 2 seconds.   Neurological:      General: No focal deficit present.      Mental Status: She is alert and oriented to person, place, and time.   Psychiatric:         Mood and Affect: Mood normal.         Behavior: Behavior  normal.         Thought Content: Thought content normal.         Judgment: Judgment normal.             ED Course     CT ABDOMEN+PELVIS(CONTRAST ONLY)(CPT=74177)    Result Date: 3/28/2024  PROCEDURE:  CT ABDOMEN+PELVIS (CONTRAST ONLY) (CPT=74177)  COMPARISON:  None.  INDICATIONS:  Abdominal Pain - Abdominal pain upper left hand side  TECHNIQUE:  CT scanning was performed from the dome of the diaphragm to the pubic symphysis with non-ionic intravenous contrast material. Post contrast coronal MPR imaging was performed.  Dose reduction techniques were used. Dose information is transmitted to the ACR (American College of Radiology) NRDR (National Radiology Data Registry) which includes the Dose Index Registry.  PATIENT STATED HISTORY:(As transcribed by Technologist)  Patient presents with upper abdominal pain with nausea, vomiting and diarrhea for 2 weeks.   CONTRAST USED:  80cc of Isovue 370  FINDINGS:  LUNG BASE:  Atelectasis. LIVER:  Homogeneous enhancement. BILIARY:  No biliary ductal dilatation. PANCREAS:  Homogeneous enhancement. SPLEEN:  Normal caliber. KIDNEYS:  No hydronephrosis or focal renal mass. ADRENALS:  Normal. AORTA/VASCULAR:  No aneurysm. RETROPERITONEUM:  No enlarged adenopathy. BOWEL/MESENTERY:  Normal caliber fluid-filled small bowel loops.  Postsurgical changes involving the stomach.  There is no CT evidence for bowel obstruction.  There is a moderate degree of stool in the ascending colon.  The descending and rectosigmoid colon is decompressed with suggestion of mild wall thickening.  There is mild pericolonic soft tissue stranding in the left upper quadrant in along the left pericolic gutter into the pelvis suggestive of colitis.  Uncomplicated colonic diverticulosis ABDOMINAL WALL:  No ventral wall hernia. PELVIC ORGANS:  Small amount of free fluid in the cul de sac.  BONES:  Mild degenerative changes in the lower lumbar facets.             CONCLUSION:  Mild wall thickening with slight  pericolonic inflammatory stranding involving the left upper quadrant and descending and sigmoid colon suggestive of mild colitis.   There is fluid-filled normal caliber small bowel.  No evidence of bowel obstruction.    LOCATION:  [id number]   Dictated by (CST): Christy Bonilla MD on 3/28/2024 at 6:30 PM     Finalized by (CST): Christy Bonilla MD on 3/28/2024 at 6:35 PM           NOTE: Patient is tender to palpation in the left upper quadrant where the CT depicts colitis.  However it has been going on for 2 weeks there is no white count.  We are going to send out for a stool panel Toradol and Bentyl for pain.  However if anything changes i.e. sustained abdominal pain fever etc. she is to go to the emergency room for further evaluation and treatment.  Also indicated to make a follow-up appointment with GI for further evaluation and treatment.           MDM   Clinical Impression: LUQ pain/colitis  Course of Treatment:   Push fluid. Recommend bowel rest.  Dropped a stool specimens off at the lab.  Recommend Zofran and Bentyl for nausea vomiting and diarrhea.  Make a follow-up appointment with GI.  If symptoms persist or worsen i.e. increasing vomiting or diarrhea abdominal pain or fevers go directly to the emergency room for further evaluation and treatment.  This case was discussed with the attending physician please see the attestation.         The patient is encouraged to return if any concerning symptoms arise. Additional verbal discharge instructions are given and discussed. Discharge medications are discussed. The patient is in good condition throughout the visit today and remains so upon discharge. I discuss the plan of care with the patient, who expresses understanding. All questions and concerns are addressed to the patient's satisfaction prior to discharge today.  Previous conversations with PCP and charts were reviewed.                                           Disposition and Plan     Clinical  Impression:  1. Abdominal pain, left upper quadrant    2. Colitis         Disposition:  Discharge  3/28/2024  7:19 pm    Follow-up:  Chad Fallon MD  [address] [address] [address] [phone]          Benjamin Villafana MD  [id number] [facility] [address] [phone]                Medications Prescribed:  Current Discharge Medication List        START taking these medications    Details   ondansetron 4 MG Oral Tablet Dispersible Take 1 tablet (4 mg total) by mouth every 4 (four) hours as needed.  Qty: 30 tablet, Refills: 0      dicyclomine 20 MG Oral Tab Take 1 tablet (20 mg total) by mouth 4 (four) times daily as needed.  Qty: 30 tablet, Refills: 0

## 2024-03-28 NOTE — ED INITIAL ASSESSMENT (HPI)
Patient states for the last 2 weeks she has had increasing mid upper and LUQ abdominal pain with N/V/D. States she did a telehealth visit and they prescribed medication but it hasn't been helpful and pain has gotten worse. States she has used tylenol PRN with some relief. States 1 episode of vomiting today and has had 3 episodes of diarrhea. States she has had chills and being lightheaded at times but denies fever or other symptoms.

## 2024-03-28 NOTE — TELEPHONE ENCOUNTER
Spoke w/pt. Nothing earlier as that is Dr. Fallon's first day back from vacation. Suggested WIC or IC. Pt. Agreed but is keeping Monday appt w/Dr. Fallon as well.

## 2024-03-28 NOTE — TELEPHONE ENCOUNTER
Patient is having left upper abdominal pain, nauseated and is asking if there is anything sooner to get in to see any doctor   Please advise.

## 2024-03-28 NOTE — DISCHARGE INSTRUCTIONS
Please return to the ER/clinic if symptoms worsen. Follow-up with your PCP in 24-48 hours as needed.    Push fluid. Recommend bowel rest.  Dropped a stool specimens off at the lab.  Recommend Zofran and Bentyl for nausea vomiting and diarrhea.  Make a follow-up appointment with GI.  If symptoms persist or worsen i.e. increasing vomiting or diarrhea abdominal pain or fevers go directly to the emergency room for further evaluation and treatment.

## 2024-03-28 NOTE — ED PROVIDER NOTES
I reviewed that chart and discussed the case.  I have examined the patient and noted patient with mild tenderness in the left upper quadrant nontender otherwise.  Moist mucous membranes.  No abdominal distention..      I provided the substantive portion of care for this patient.  I personally performed the medical decision making for this encounter.  Labs reassuring.  CT with mild colitis in the area of discomfort, unclear etiology.  Strongly recommend outpatient follow-up with gastroenterology.  Can try Bentyl for now..    I agree with the following clinical impression(s): Colitis      I agree with the plan as noted.

## 2024-03-29 PROCEDURE — 96375 TX/PRO/DX INJ NEW DRUG ADDON: CPT

## 2024-03-29 PROCEDURE — 96361 HYDRATE IV INFUSION ADD-ON: CPT

## 2024-03-29 PROCEDURE — 99284 EMERGENCY DEPT VISIT MOD MDM: CPT

## 2024-03-29 PROCEDURE — 96374 THER/PROPH/DIAG INJ IV PUSH: CPT

## 2024-03-29 PROCEDURE — 99285 EMERGENCY DEPT VISIT HI MDM: CPT

## 2024-03-30 ENCOUNTER — APPOINTMENT (OUTPATIENT)
Dept: CT IMAGING | Facility: HOSPITAL | Age: 44
End: 2024-03-30
Attending: EMERGENCY MEDICINE
Payer: COMMERCIAL

## 2024-03-30 ENCOUNTER — HOSPITAL ENCOUNTER (EMERGENCY)
Facility: HOSPITAL | Age: 44
Discharge: HOME | End: 2024-03-30
Attending: EMERGENCY MEDICINE
Payer: COMMERCIAL

## 2024-03-30 VITALS
DIASTOLIC BLOOD PRESSURE: 71 MMHG | SYSTOLIC BLOOD PRESSURE: 115 MMHG | HEART RATE: 61 BPM | RESPIRATION RATE: 15 BRPM | BODY MASS INDEX: 28.22 KG/M2 | HEIGHT: 59 IN | TEMPERATURE: 98 F | OXYGEN SATURATION: 99 % | WEIGHT: 140 LBS

## 2024-03-30 DIAGNOSIS — R10.13 EPIGASTRIC PAIN: Primary | ICD-10-CM

## 2024-03-30 LAB
ALBUMIN SERPL-MCNC: 4 G/DL (ref 3.4–5)
ALBUMIN/GLOB SERPL: 1.2 {RATIO} (ref 1–2)
ALP LIVER SERPL-CCNC: 48 U/L
ALT SERPL-CCNC: 24 U/L
ANION GAP SERPL CALC-SCNC: 4 MMOL/L (ref 0–18)
AST SERPL-CCNC: 19 U/L (ref 15–37)
BASOPHILS # BLD AUTO: 0.04 X10(3) UL (ref 0–0.2)
BASOPHILS NFR BLD AUTO: 0.7 %
BILIRUB SERPL-MCNC: 0.6 MG/DL (ref 0.1–2)
BILIRUB UR QL STRIP.AUTO: NEGATIVE
BUN BLD-MCNC: 9 MG/DL (ref 9–23)
CALCIUM BLD-MCNC: 9 MG/DL (ref 8.5–10.1)
CHLORIDE SERPL-SCNC: 109 MMOL/L (ref 98–112)
CLARITY UR REFRACT.AUTO: CLEAR
CO2 SERPL-SCNC: 28 MMOL/L (ref 21–32)
COLOR UR AUTO: YELLOW
CREAT BLD-MCNC: 0.65 MG/DL
EGFRCR SERPLBLD CKD-EPI 2021: 113 ML/MIN/1.73M2 (ref 60–?)
EOSINOPHIL # BLD AUTO: 0.06 X10(3) UL (ref 0–0.7)
EOSINOPHIL NFR BLD AUTO: 1 %
ERYTHROCYTE [DISTWIDTH] IN BLOOD BY AUTOMATED COUNT: 12.7 %
GLOBULIN PLAS-MCNC: 3.3 G/DL (ref 2.8–4.4)
GLUCOSE BLD-MCNC: 85 MG/DL (ref 70–99)
GLUCOSE UR STRIP.AUTO-MCNC: NEGATIVE MG/DL
HCG SERPL QL: NEGATIVE
HCT VFR BLD AUTO: 36.4 %
HGB BLD-MCNC: 12.8 G/DL
IMM GRANULOCYTES # BLD AUTO: 0.01 X10(3) UL (ref 0–1)
IMM GRANULOCYTES NFR BLD: 0.2 %
LEUKOCYTE ESTERASE UR QL STRIP.AUTO: NEGATIVE
LIPASE SERPL-CCNC: 38 U/L (ref 13–75)
LYMPHOCYTES # BLD AUTO: 2.58 X10(3) UL (ref 1–4)
LYMPHOCYTES NFR BLD AUTO: 44.3 %
MCH RBC QN AUTO: 31.4 PG (ref 26–34)
MCHC RBC AUTO-ENTMCNC: 35.2 G/DL (ref 31–37)
MCV RBC AUTO: 89.2 FL
MONOCYTES # BLD AUTO: 0.55 X10(3) UL (ref 0.1–1)
MONOCYTES NFR BLD AUTO: 9.5 %
NEUTROPHILS # BLD AUTO: 2.58 X10 (3) UL (ref 1.5–7.7)
NEUTROPHILS # BLD AUTO: 2.58 X10(3) UL (ref 1.5–7.7)
NEUTROPHILS NFR BLD AUTO: 44.3 %
NITRITE UR QL STRIP.AUTO: NEGATIVE
OSMOLALITY SERPL CALC.SUM OF ELEC: 290 MOSM/KG (ref 275–295)
PH UR STRIP.AUTO: 6 [PH] (ref 5–8)
PLATELET # BLD AUTO: 236 10(3)UL (ref 150–450)
POTASSIUM SERPL-SCNC: 4.5 MMOL/L (ref 3.5–5.1)
PROT SERPL-MCNC: 7.3 G/DL (ref 6.4–8.2)
PROT UR STRIP.AUTO-MCNC: NEGATIVE MG/DL
RBC # BLD AUTO: 4.08 X10(6)UL
SODIUM SERPL-SCNC: 141 MMOL/L (ref 136–145)
SP GR UR STRIP.AUTO: 1.01 (ref 1–1.03)
UROBILINOGEN UR STRIP.AUTO-MCNC: 0.2 MG/DL
WBC # BLD AUTO: 5.8 X10(3) UL (ref 4–11)

## 2024-03-30 PROCEDURE — 81001 URINALYSIS AUTO W/SCOPE: CPT | Performed by: EMERGENCY MEDICINE

## 2024-03-30 PROCEDURE — 83690 ASSAY OF LIPASE: CPT

## 2024-03-30 PROCEDURE — 85025 COMPLETE CBC W/AUTO DIFF WBC: CPT | Performed by: EMERGENCY MEDICINE

## 2024-03-30 PROCEDURE — 74177 CT ABD & PELVIS W/CONTRAST: CPT | Performed by: EMERGENCY MEDICINE

## 2024-03-30 PROCEDURE — 84703 CHORIONIC GONADOTROPIN ASSAY: CPT | Performed by: EMERGENCY MEDICINE

## 2024-03-30 PROCEDURE — 80053 COMPREHEN METABOLIC PANEL: CPT

## 2024-03-30 PROCEDURE — 81001 URINALYSIS AUTO W/SCOPE: CPT

## 2024-03-30 PROCEDURE — 81015 MICROSCOPIC EXAM OF URINE: CPT

## 2024-03-30 PROCEDURE — S0028 INJECTION, FAMOTIDINE, 20 MG: HCPCS | Performed by: EMERGENCY MEDICINE

## 2024-03-30 PROCEDURE — 85025 COMPLETE CBC W/AUTO DIFF WBC: CPT

## 2024-03-30 PROCEDURE — 80053 COMPREHEN METABOLIC PANEL: CPT | Performed by: EMERGENCY MEDICINE

## 2024-03-30 PROCEDURE — 83690 ASSAY OF LIPASE: CPT | Performed by: EMERGENCY MEDICINE

## 2024-03-30 RX ORDER — KETOROLAC TROMETHAMINE 15 MG/ML
15 INJECTION, SOLUTION INTRAMUSCULAR; INTRAVENOUS ONCE
Status: COMPLETED | OUTPATIENT
Start: 2024-03-30 | End: 2024-03-30

## 2024-03-30 RX ORDER — FAMOTIDINE 10 MG/ML
20 INJECTION, SOLUTION INTRAVENOUS ONCE
Status: COMPLETED | OUTPATIENT
Start: 2024-03-30 | End: 2024-03-30

## 2024-03-30 RX ORDER — FAMOTIDINE 20 MG/1
20 TABLET, FILM COATED ORAL 2 TIMES DAILY PRN
Qty: 14 TABLET | Refills: 0 | Status: SHIPPED | OUTPATIENT
Start: 2024-03-30 | End: 2024-04-06

## 2024-03-30 RX ORDER — SUCRALFATE 1 G/1
1 TABLET ORAL
Qty: 28 TABLET | Refills: 0 | Status: SHIPPED | OUTPATIENT
Start: 2024-03-30 | End: 2024-04-06

## 2024-03-30 NOTE — ED INITIAL ASSESSMENT (HPI)
A&Ox3 patient p/w LUQ abdominal pain x2 weeks w/ worsening pain    Patient reports going to an [facility] yesterday, had CT showing \"mild colitis\" and blood work done, told to come to ED if pain worsens    +nausea    RR even/NL, speaking in full clear sentences, ambulatory w/ steady gait

## 2024-03-30 NOTE — DISCHARGE INSTRUCTIONS
Follow-up with a gastroenterologist as planned in several weeks.  Take the medication as prescribed.  Return for any worsening pain or other concerning symptoms.

## 2024-04-01 ENCOUNTER — LAB ENCOUNTER (OUTPATIENT)
Dept: LAB | Age: 44
End: 2024-04-01
Attending: PHYSICIAN ASSISTANT
Payer: COMMERCIAL

## 2024-04-01 DIAGNOSIS — K52.9 COLITIS: ICD-10-CM

## 2024-04-01 LAB
ADENOVIRUS F 40/41 PCR: NEGATIVE
ASTROVIRUS PCR: NEGATIVE
C CAYETANENSIS DNA SPEC QL NAA+PROBE: NEGATIVE
CAMPY SP DNA.DIARRHEA STL QL NAA+PROBE: NEGATIVE
CRYPTOSP DNA SPEC QL NAA+PROBE: NEGATIVE
EAEC PAA PLAS AGGR+AATA ST NAA+NON-PRB: NEGATIVE
EC STX1+STX2 + H7 FLIC SPEC NAA+PROBE: NEGATIVE
ENTAMOEBA HISTOLYTICA PCR: NEGATIVE
EPEC EAE GENE STL QL NAA+NON-PROBE: NEGATIVE
ETEC LTA+ST1A+ST1B TOX ST NAA+NON-PROBE: NEGATIVE
GIARDIA LAMBLIA PCR: NEGATIVE
HEMOCCULT STL QL: NEGATIVE
NOROVIRUS GI/GII PCR: NEGATIVE
P SHIGELLOIDES DNA STL QL NAA+PROBE: NEGATIVE
ROTAVIRUS A PCR: NEGATIVE
SALMONELLA DNA SPEC QL NAA+PROBE: NEGATIVE
SAPOVIRUS PCR: NEGATIVE
SHIGELLA SP+EIEC IPAH ST NAA+NON-PROBE: NEGATIVE
V CHOLERAE DNA SPEC QL NAA+PROBE: NEGATIVE
VIBRIO DNA SPEC NAA+PROBE: NEGATIVE
YERSINIA DNA SPEC NAA+PROBE: NEGATIVE

## 2024-04-01 PROCEDURE — 82272 OCCULT BLD FECES 1-3 TESTS: CPT

## 2024-04-01 PROCEDURE — 87045 FECES CULTURE AEROBIC BACT: CPT

## 2024-04-01 PROCEDURE — 87507 IADNA-DNA/RNA PROBE TQ 12-25: CPT | Performed by: EMERGENCY MEDICINE

## 2024-04-01 PROCEDURE — 87493 C DIFF AMPLIFIED PROBE: CPT | Performed by: EMERGENCY MEDICINE

## 2024-04-01 PROCEDURE — 87427 SHIGA-LIKE TOXIN AG IA: CPT

## 2024-04-01 PROCEDURE — 87046 STOOL CULTR AEROBIC BACT EA: CPT

## 2024-04-02 PROBLEM — N39.3 FEMALE STRESS INCONTINENCE: Status: ACTIVE | Noted: 2017-05-17

## 2024-04-02 PROBLEM — R60.0 EDEMA OF LOWER EXTREMITY: Status: ACTIVE | Noted: 2017-05-17

## 2024-04-02 PROBLEM — R11.10 VOMITING: Status: ACTIVE | Noted: 2017-10-04

## 2024-04-02 PROBLEM — M54.50 LOW BACK PAIN: Status: ACTIVE | Noted: 2017-05-17

## 2024-04-03 ENCOUNTER — OFFICE VISIT (OUTPATIENT)
Dept: FAMILY MEDICINE CLINIC | Facility: CLINIC | Age: 44
End: 2024-04-03
Payer: COMMERCIAL

## 2024-04-03 VITALS
DIASTOLIC BLOOD PRESSURE: 70 MMHG | SYSTOLIC BLOOD PRESSURE: 122 MMHG | OXYGEN SATURATION: 97 % | BODY MASS INDEX: 28.46 KG/M2 | HEIGHT: 59 IN | HEART RATE: 65 BPM | WEIGHT: 141.19 LBS | TEMPERATURE: 98 F | RESPIRATION RATE: 16 BRPM

## 2024-04-03 DIAGNOSIS — G89.29 CHRONIC ABDOMINAL PAIN: Primary | ICD-10-CM

## 2024-04-03 DIAGNOSIS — R19.7 DIARRHEA, UNSPECIFIED TYPE: ICD-10-CM

## 2024-04-03 DIAGNOSIS — R11.0 NAUSEA: ICD-10-CM

## 2024-04-03 DIAGNOSIS — H93.8X2 EAR PRESSURE, LEFT: ICD-10-CM

## 2024-04-03 DIAGNOSIS — R10.12 LUQ PAIN: ICD-10-CM

## 2024-04-03 DIAGNOSIS — R10.9 CHRONIC ABDOMINAL PAIN: Primary | ICD-10-CM

## 2024-04-03 PROCEDURE — 99214 OFFICE O/P EST MOD 30 MIN: CPT | Performed by: FAMILY MEDICINE

## 2024-04-03 NOTE — PATIENT INSTRUCTIONS
Continue flonase every morning    Add generic claritin    Continue all meds as prescribed    Call GI office tomorrow if still unable to give Cdiff sample    Followup in 3 months

## 2024-04-03 NOTE — PROGRESS NOTES
Lauren Berry is a 43 year old female here for   Chief Complaint   Patient presents with    Abdominal Pain     Left upper left abdominal pain for 1month was seen ED 3/20/24    Ear Problem     Left ear clogged         HPI:       1. Chronic abdominal pain  2. LUQ pain  3. Nausea  4. Diarrhea, unspecified type  -seen in ER x 2 last week  -seen by GI yesterday  -feeling better with additional of sucralfate and pepcid  -diarrhea improving to the point she hasn't been able to give sample for cdiff  -has upcoming EGD/colonoscopy to schedule as well    5. Ear pressure, left  -complains of mild pressure  -flonase not helping much        HISTORY:  Past Medical History:   Diagnosis Date    Abdominal pain 3/5/2024    Anxiety     Depression 2021    Diarrhea, unspecified 3/5/2024    Fatigue 3/5/2024    Food intolerance 3/6/2024    Irregular bowel habits 3/5/2024    Leaking of urine     On going    Nasal congestion     Nausea 3/5/24    Obesity 3/13/2023    Uncomfortable fullness after meals     Due to gastric bypass sleeve    Vomiting 3/5/24    Wears glasses       Past Surgical History:   Procedure Laterality Date    GASTRIC BYPASS,OBESITY,SB RECONSTRUC  2018    HYSTERECTOMY  2021    LAP GASTRIC BYPASS/CONSTANCE-EN-Y        1999    TOTAL ABDOM HYSTERECTOMY        Family History   Problem Relation Age of Onset    Diabetes Maternal Grandmother     Other (Other [Other]) Paternal Grandfather         Liver disease      Social History:   Social History     Socioeconomic History    Marital status:    Tobacco Use    Smoking status: Never    Smokeless tobacco: Never   Vaping Use    Vaping Use: Never used   Substance and Sexual Activity    Alcohol use: Yes     Alcohol/week: 1.0 standard drink of alcohol     Types: 1 Standard drinks or equivalent per week     Comment: Socially    Drug use: No    Sexual activity: Yes     Partners: Male     Birth control/protection: Hysterectomy   Other Topics Concern     Caffeine Concern Yes     Comment: 1 cup of coffee daily    Stress Concern No    Weight Concern Yes     Comment: Not able to lose weight    Special Diet No    Exercise Yes     Comment: 6-7 x weekly    Seat Belt Yes        Medications (Active prior to today's visit):  Current Outpatient Medications   Medication Sig Dispense Refill    sucralfate 1 g Oral Tab Take 1 tablet (1 g total) by mouth 4 (four) times daily before meals and nightly for 7 days. 28 tablet 0    famotidine (PEPCID) 20 MG Oral Tab Take 1 tablet (20 mg total) by mouth 2 (two) times daily as needed for Heartburn. 14 tablet 0    pantoprazole 40 MG Oral Tab EC Take 1 tablet (40 mg total) by mouth daily. ON AN EMPTY STOMACH      ondansetron 4 MG Oral Tablet Dispersible Take 1 tablet (4 mg total) by mouth every 4 (four) hours as needed. 30 tablet 0    dicyclomine 20 MG Oral Tab Take 1 tablet (20 mg total) by mouth 4 (four) times daily as needed. 30 tablet 0    Multiple Vitamin (MULTI-VITAMIN) Oral Tab Take 1 tablet by mouth daily.      Scopolamine 1.5mg TD patch 1mg/3days Place 1 patch onto the skin every third day. (Patient not taking: Reported on 4/3/2024) 4 patch 0    meclizine 25 MG Oral Tab Take 0.5-1 tablets (12.5-25 mg total) by mouth 3 (three) times daily as needed for Dizziness. (Patient not taking: Reported on 4/3/2024) 30 tablet 0    traZODone 50 MG Oral Tab Take 0.5-1 tablets (25-50 mg total) by mouth nightly as needed for Sleep. (Patient not taking: Reported on 4/3/2024) 90 tablet 1       Allergies:  Allergies   Allergen Reactions    Shellfish-Derived Products HIVES    Adhesive Tape (Rosins) RASH    Latex RASH         ROS:   See HPI for relevant ROS    --GEN: No other complaints  --HEENT: No other complaints  --RESP: No other complaints  --CV: No other complaints  --GI: No other complaints  --MSK: No other complaints    All other systems reviewed are negative    PHYSICAL EXAM:   /70 (BP Location: Right arm, Patient Position: Sitting)    Pulse 65   Temp 97.9 °F (36.6 °C) (Temporal)   Resp 16   Ht 4' 11\" (1.499 m)   Wt 141 lb 3.2 oz (64 kg)   LMP 10/19/2021   SpO2 97%   BMI 28.52 kg/m²     Gen: NAD  HEENT: NCAT, pupils equal and round, left TM with bulging but no erythema, normal right TM  Pulm: CTAB, no wheezing  CV: RRR  Ext: full ROM  Psych: normal affect     ASSESSMENT/PLAN:     1. Chronic abdominal pain  2. LUQ pain  3. Nausea  4. Diarrhea, unspecified type  -slow improvement  -c/w pepcid, sucralfate  -f/u with GI as planned  -f/u with EGD/colonoscopy as planned  -f/u here prn    5. Ear pressure, left  -no significant findings on exam  -c/w flonase  -add antihistamine  -consider ENT if not improving  -f/u prn        Chronic Conditions:    No problem-specific Assessment & Plan notes found for this encounter.       Health Maintenance:  Health Maintenance   Topic Date Due    COVID-19 Vaccine (4 - 2023-24 season) 09/01/2023    Mammogram  08/25/2024    Annual Physical  09/09/2024    DTaP,Tdap,and Td Vaccines (4 - Td or Tdap) 08/18/2031    Influenza Vaccine  Completed    Annual Depression Screening  Completed    Pneumococcal Vaccine: Birth to 64yrs  Aged Out               The patient is asked to return in prn.    Orders This Visit:  No orders of the defined types were placed in this encounter.      Meds This Visit:  Requested Prescriptions      No prescriptions requested or ordered in this encounter       Imaging & Referrals:  None     PATRICK PATEL MD    I spent a total of 30 minutes, more than half of which was spent counseling/coordinating care regarding abd pain, ear pain

## 2024-04-08 ENCOUNTER — PATIENT MESSAGE (OUTPATIENT)
Dept: FAMILY MEDICINE CLINIC | Facility: CLINIC | Age: 44
End: 2024-04-08

## 2024-04-08 NOTE — TELEPHONE ENCOUNTER
From: Lauren Berry  To: PATRICK PATEL  Sent: 4/8/2024 10:55 AM CDT  Subject: Prescription for Sucralfate     Hi Dr. Patel - I was able to schedule my endoscopy and colonoscopy for 4/15 however I finished the Sucralfate 1GM & Famotidine 20MG that were prescribed by the ER doctor. I'm wondering if at all possible how I can go about getting a refill for them as the Sucralfate helps so that I can still be able to eat and manage the pain .     Thanks for your help!   Lauren Berry ([place])

## 2024-04-09 ENCOUNTER — HOSPITAL ENCOUNTER (OUTPATIENT)
Dept: ULTRASOUND IMAGING | Age: 44
Discharge: HOME | End: 2024-04-09
Attending: INTERNAL MEDICINE
Payer: COMMERCIAL

## 2024-04-09 DIAGNOSIS — K52.9 CHRONIC DIARRHEA: ICD-10-CM

## 2024-04-09 DIAGNOSIS — R10.13 EPIGASTRIC PAIN: ICD-10-CM

## 2024-04-09 DIAGNOSIS — R11.2 NAUSEA AND VOMITING, UNSPECIFIED VOMITING TYPE: ICD-10-CM

## 2024-04-09 PROCEDURE — 76700 US EXAM ABDOM COMPLETE: CPT | Performed by: INTERNAL MEDICINE

## 2024-04-09 RX ORDER — FAMOTIDINE 20 MG/1
20 TABLET, FILM COATED ORAL 2 TIMES DAILY PRN
Qty: 180 TABLET | Refills: 0 | Status: SHIPPED | OUTPATIENT
Start: 2024-04-09

## 2024-04-09 RX ORDER — SUCRALFATE 1 G/1
1 TABLET ORAL
Qty: 360 TABLET | Refills: 0 | Status: SHIPPED | OUTPATIENT
Start: 2024-04-09

## 2024-04-15 PROBLEM — R11.2 NAUSEA WITH VOMITING: Status: ACTIVE | Noted: 2024-04-15

## 2024-04-15 PROBLEM — R93.3 ABNORMAL FINDINGS ON DIAGNOSTIC IMAGING OF DIGESTIVE SYSTEM: Status: ACTIVE | Noted: 2024-04-15

## 2024-04-15 PROBLEM — R10.13 ABDOMINAL PAIN, EPIGASTRIC: Status: ACTIVE | Noted: 2024-04-15

## 2024-04-15 PROBLEM — K29.30 CHRONIC SUPERFICIAL GASTRITIS WITHOUT BLEEDING: Status: ACTIVE | Noted: 2024-04-15

## 2024-04-15 PROBLEM — R19.7 DIARRHEA: Status: ACTIVE | Noted: 2024-04-15

## 2024-05-08 ENCOUNTER — TELEMEDICINE (OUTPATIENT)
Dept: FAMILY MEDICINE CLINIC | Facility: CLINIC | Age: 44
End: 2024-05-08
Payer: COMMERCIAL

## 2024-05-08 DIAGNOSIS — H00.021 HORDEOLUM INTERNUM OF RIGHT UPPER EYELID: Primary | ICD-10-CM

## 2024-05-08 PROCEDURE — 99213 OFFICE O/P EST LOW 20 MIN: CPT | Performed by: FAMILY MEDICINE

## 2024-05-08 RX ORDER — ERYTHROMYCIN 5 MG/G
1 OINTMENT OPHTHALMIC 3 TIMES DAILY
Qty: 1 G | Refills: 0 | Status: SHIPPED | OUTPATIENT
Start: 2024-05-08

## 2024-05-08 RX ORDER — AMOXICILLIN AND CLAVULANATE POTASSIUM 875; 125 MG/1; MG/1
1 TABLET, FILM COATED ORAL EVERY 12 HOURS
Qty: 14 TABLET | Refills: 0 | Status: SHIPPED | OUTPATIENT
Start: 2024-05-08 | End: 2024-05-15

## 2024-05-09 NOTE — PROGRESS NOTES
MyChart video visit with live interactive synchronous audio and video    Lauren Berry verbally consents to a MyChart video visit with live interactive synchronous audio and video service on 05/08/24.  Patient has been referred to the [facility] website at [email or web address] to review the yearly Consent to Treat document.  Patient understands and accepts financial responsibility for any deductible, co-insurance and/or co-pays associated with this service.        Please note that the following visit was completed using two-way, real-time interactive audio and/or video communication.  This has been done in good tonie to provide continuity of care in the best interest of the provider-patient relationship, due to the ongoing public health crisis/national emergency and because of restrictions of visitation.  There are limitations of this visit as no physical exam could be performed.  Every conscious effort was taken to allow for sufficient and adequate time.  This billing was spent on reviewing labs, medications, radiology tests and decision making.  Appropriate medical decision-making and tests are ordered as detailed in the plan of care above.    Time spent was 10 minutes, more than 50% of time was spent on counseling regarding medical conditions and treatment.  Rest of time was spent reviewing chart, and reviewing any pertinent blood work and radiology tests.           Lauren Berry is a 43 year old female.      Chief Complaint   Patient presents with    Sty     C/o R eye for the past 2 weeks, that is now becoming painful            HPI:         Eye problem:  Patient complains of a \"stye eye\" of right upper eyelid for the last 2 weeks.  It is becoming more and more painful.  Warm compresses without improvement.  She has had similar in the past that responded well to antibiotics.          Current Outpatient Medications   Medication Sig Dispense Refill    erythromycin 5 MG/GM Ophthalmic Ointment  Place 1 Application into the right eye in the morning, at noon, and at bedtime. (Upper eyelid) 1 g 0    amoxicillin clavulanate 875-125 MG Oral Tab Take 1 tablet by mouth every 12 (twelve) hours for 7 days. 14 tablet 0    famotidine (PEPCID) 20 MG Oral Tab Take 1 tablet (20 mg total) by mouth 2 (two) times daily as needed for Heartburn. 180 tablet 0    meclizine 25 MG Oral Tab Take 0.5-1 tablets (12.5-25 mg total) by mouth 3 (three) times daily as needed for Dizziness. (Patient not taking: Reported on 4/3/2024) 30 tablet 0    Multiple Vitamin (MULTI-VITAMIN) Oral Tab Take 1 tablet by mouth daily.        Patient Active Problem List   Diagnosis    Pre-operative cardiovascular examination    Abnormal EKG    Dyspnea on exertion    Class 2 obesity due to excess calories without serious comorbidity with body mass index (BMI) of 35.0 to 35.9 in adult    Bilateral leg edema    Major depressive disorder, recurrent, moderate (HCC)    Anxiety    Vomiting    Low back pain    Female stress incontinence    Edema of lower extremity    Abdominal pain, epigastric    Nausea with vomiting    Chronic superficial gastritis without bleeding    Diarrhea    Abnormal findings on diagnostic imaging of digestive system      Past Medical History:    Abdominal pain    Anxiety    Depression    Diarrhea, unspecified    Fatigue    Food intolerance    Irregular bowel habits    Leaking of urine    On going    Nasal congestion    Nausea    Obesity    Uncomfortable fullness after meals    Due to gastric bypass sleeve    Vomiting    Wears glasses      Social History:  Social History     Socioeconomic History    Marital status:    Tobacco Use    Smoking status: Never    Smokeless tobacco: Never   Vaping Use    Vaping status: Never Used   Substance and Sexual Activity    Alcohol use: Yes     Alcohol/week: 1.0 standard drink of alcohol     Types: 1 Standard drinks or equivalent per week     Comment: Socially    Drug use: No    Sexual activity: Yes      Partners: Male     Birth control/protection: Hysterectomy   Other Topics Concern    Caffeine Concern Yes     Comment: 1 cup of coffee daily    Stress Concern No    Weight Concern Yes     Comment: Not able to lose weight    Special Diet No    Exercise Yes     Comment: 6-7 x weekly    Seat Belt Yes     Social Determinants of Health     Transportation Needs: No Transportation Needs (7/3/2020)    [facility]    PRAPARE - Transportation     Lack of Transportation (Medical): No     Lack of Transportation (Non-Medical): No   Physical Activity: Insufficiently Active (12/2/2021)    [facility]    Exercise Vital Sign     Days of Exercise per Week: 2 days     Minutes of Exercise per Session: 30 min   Stress: High Risk (12/2/2021)    [facility]    Stress     Stress is when someone feels tense, nervous, anxious, or can't sleep at night because their mind is troubled. How stressed are you? : Very much        REVIEW OF SYSTEMS:   GENERAL: Overall feels well otherwise  EYES: Denies changes in vision  HEENT: No complaints of sore throat or other upper respiratory symptoms.  LUNGS: No complaints of cough or shortness of breath  CARDIOVASCULAR: No complaints of chest pain or palpitations.  NEURO: Denies headache or dizziness.        EXAM:   LMP 10/19/2021   GENERAL: Pleasant.  Comfortably speaking in full sentences.  Overall well-appearing female.  HEENT: Eyes: Right upper palpebrae with diffuse macular erythema and somewhat centrally of the above lid margin there is a mass.  Conjunctiva normal.  LUNGS: No cough during video visit.  No audible dyspnea.  No audible wheezing.  NEURO: Alert and oriented x3  PSYCH: Normal affect.  Intonation of voice is normal.  No pressured speech.        Immunization History   Administered Date(s) Administered    Covid-19 Vaccine [facility] (J&J) 0.5ml  03/12/2021    Covid-19 Vaccine Pfizer 30 mcg/0.3 ml 11/01/2021    Covid-19 Vaccine Pfizer Bivalent 30mcg/0.3mL 12/06/2022    DTP 03/24/1999, 05/15/1999    FLULAVAL 6 months & older 0.5 ml Prefilled syringe (91486) 10/05/2021    FLUZONE 6 months and older PFS 0.5 ml (01204) 10/05/2021, 12/09/2023    HEP B, Ped/Adol 01/14/1999, 02/17/1999    Hib, Unspecified Formulation 03/24/1999, 05/15/1999    IPV 03/24/1999    Influenza 10/01/2022    TDAP 08/18/2021             ASSESSMENT AND PLAN:     Encounter Diagnosis   Name Primary?    Hordeolum internum of right upper eyelid Yes       1. Hordeolum internum of right upper eyelid  We will treat empirically with topical antibiotic erythromycin and systemically with generic Augmentin.  Continue warm compresses as needed.  If does not resolve or improve over the next 3 to 5 days, patient to call or return to clinic.  Patient of Dr. Ha.    - erythromycin 5 MG/GM Ophthalmic Ointment; Place 1 Application into the right eye in the morning, at noon, and at bedtime. (Upper eyelid)  Dispense: 1 g; Refill: 0  - amoxicillin clavulanate 875-125 MG Oral Tab; Take 1 tablet by mouth every 12 (twelve) hours for 7 days.  Dispense: 14 tablet; Refill: 0        Meds & Refills for this Visit:  Requested Prescriptions     Signed Prescriptions Disp Refills    erythromycin 5 MG/GM Ophthalmic Ointment 1 g 0     Sig: Place 1 Application into the right eye in the morning, at noon, and at bedtime. (Upper eyelid)    amoxicillin clavulanate 875-125 MG Oral Tab 14 tablet 0     Sig: Take 1 tablet by mouth every 12 (twelve) hours for 7 days.       Return if symptoms worsen or fail to improve.

## 2024-05-23 ENCOUNTER — APPOINTMENT (OUTPATIENT)
Dept: GENERAL RADIOLOGY | Age: 44
End: 2024-05-23
Attending: EMERGENCY MEDICINE

## 2024-05-23 ENCOUNTER — HOSPITAL ENCOUNTER (EMERGENCY)
Age: 44
Discharge: HOME | End: 2024-05-23
Attending: EMERGENCY MEDICINE

## 2024-05-23 ENCOUNTER — APPOINTMENT (OUTPATIENT)
Dept: CT IMAGING | Age: 44
End: 2024-05-23
Attending: EMERGENCY MEDICINE

## 2024-05-23 VITALS
WEIGHT: 135 LBS | DIASTOLIC BLOOD PRESSURE: 66 MMHG | HEIGHT: 58 IN | OXYGEN SATURATION: 99 % | HEART RATE: 63 BPM | SYSTOLIC BLOOD PRESSURE: 121 MMHG | TEMPERATURE: 99 F | RESPIRATION RATE: 18 BRPM | BODY MASS INDEX: 28.34 KG/M2
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
## 2024-05-23 DIAGNOSIS — S16.1XXA STRAIN OF NECK MUSCLE, INITIAL ENCOUNTER: ICD-10-CM

## 2024-05-23 DIAGNOSIS — R10.9 ABDOMINAL PAIN, UNSPECIFIED ABDOMINAL LOCATION: ICD-10-CM

## 2024-05-23 LAB
ALBUMIN SERPL-MCNC: 4 G/DL (ref 3.4–5)
ALBUMIN/GLOB SERPL: 1.1 {RATIO} (ref 1–2)
ALP LIVER SERPL-CCNC: 67 U/L
ALT SERPL-CCNC: 35 U/L
ANION GAP SERPL CALC-SCNC: 4 MMOL/L (ref 0–18)
AST SERPL-CCNC: 25 U/L (ref 15–37)
BASOPHILS # BLD AUTO: 0.03 X10(3) UL (ref 0–0.2)
BASOPHILS NFR BLD AUTO: 0.4 %
BILIRUB SERPL-MCNC: 0.3 MG/DL (ref 0.1–2)
BUN BLD-MCNC: 19 MG/DL (ref 9–23)
CALCIUM BLD-MCNC: 9.1 MG/DL (ref 8.5–10.1)
CHLORIDE SERPL-SCNC: 109 MMOL/L (ref 98–112)
CO2 SERPL-SCNC: 27 MMOL/L (ref 21–32)
CREAT BLD-MCNC: 0.76 MG/DL
EGFRCR SERPLBLD CKD-EPI 2021: 100 ML/MIN/1.73M2 (ref 60–?)
EOSINOPHIL # BLD AUTO: 0.03 X10(3) UL (ref 0–0.7)
EOSINOPHIL NFR BLD AUTO: 0.4 %
ERYTHROCYTE [DISTWIDTH] IN BLOOD BY AUTOMATED COUNT: 13.3 %
GLOBULIN PLAS-MCNC: 3.7 G/DL (ref 2.8–4.4)
GLUCOSE BLD-MCNC: 96 MG/DL (ref 70–99)
HCT VFR BLD AUTO: 37.4 %
HGB BLD-MCNC: 12.8 G/DL
IMM GRANULOCYTES # BLD AUTO: 0.01 X10(3) UL (ref 0–1)
IMM GRANULOCYTES NFR BLD: 0.1 %
LYMPHOCYTES # BLD AUTO: 2.09 X10(3) UL (ref 1–4)
LYMPHOCYTES NFR BLD AUTO: 27.3 %
MCH RBC QN AUTO: 32.3 PG (ref 26–34)
MCHC RBC AUTO-ENTMCNC: 34.2 G/DL (ref 31–37)
MCV RBC AUTO: 94.4 FL
MONOCYTES # BLD AUTO: 0.54 X10(3) UL (ref 0.1–1)
MONOCYTES NFR BLD AUTO: 7 %
NEUTROPHILS # BLD AUTO: 4.96 X10 (3) UL (ref 1.5–7.7)
NEUTROPHILS # BLD AUTO: 4.96 X10(3) UL (ref 1.5–7.7)
NEUTROPHILS NFR BLD AUTO: 64.8 %
OSMOLALITY SERPL CALC.SUM OF ELEC: 292 MOSM/KG (ref 275–295)
PLATELET # BLD AUTO: 284 10(3)UL (ref 150–450)
POTASSIUM SERPL-SCNC: 4 MMOL/L (ref 3.5–5.1)
PROT SERPL-MCNC: 7.7 G/DL (ref 6.4–8.2)
RBC # BLD AUTO: 3.96 X10(6)UL
SODIUM SERPL-SCNC: 140 MMOL/L (ref 136–145)
WBC # BLD AUTO: 7.7 X10(3) UL (ref 4–11)

## 2024-05-23 PROCEDURE — 80053 COMPREHEN METABOLIC PANEL: CPT | Performed by: EMERGENCY MEDICINE

## 2024-05-23 PROCEDURE — 85025 COMPLETE CBC W/AUTO DIFF WBC: CPT | Performed by: EMERGENCY MEDICINE

## 2024-05-23 PROCEDURE — 99284 EMERGENCY DEPT VISIT MOD MDM: CPT

## 2024-05-23 PROCEDURE — 72072 X-RAY EXAM THORAC SPINE 3VWS: CPT | Performed by: EMERGENCY MEDICINE

## 2024-05-23 PROCEDURE — 36415 COLL VENOUS BLD VENIPUNCTURE: CPT

## 2024-05-23 PROCEDURE — 74177 CT ABD & PELVIS W/CONTRAST: CPT | Performed by: EMERGENCY MEDICINE

## 2024-05-23 PROCEDURE — 72040 X-RAY EXAM NECK SPINE 2-3 VW: CPT | Performed by: EMERGENCY MEDICINE

## 2024-05-23 RX ORDER — CYCLOBENZAPRINE HCL 10 MG
10 TABLET ORAL 3 TIMES DAILY PRN
Qty: 20 TABLET | Refills: 0 | Status: SHIPPED | OUTPATIENT
Start: 2024-05-23 | End: 2024-06-02

## 2024-05-23 RX ORDER — CYCLOBENZAPRINE HCL 10 MG
10 TABLET ORAL ONCE
Status: COMPLETED | OUTPATIENT
Start: 2024-05-23 | End: 2024-05-23

## 2024-05-23 NOTE — DISCHARGE INSTRUCTIONS
TAKE 200 MG IBUPROFEN WITH 500 MG TYLENOL EVERY 4 TO 6 HOURS AS NEEDED FOR PAIN    Cyclobenzaprine for muscle relaxation
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
## 2024-05-30 ENCOUNTER — APPOINTMENT (OUTPATIENT)
Dept: GENERAL RADIOLOGY | Age: 44
End: 2024-05-30
Attending: NURSE PRACTITIONER
Payer: COMMERCIAL

## 2024-05-30 ENCOUNTER — HOSPITAL ENCOUNTER (OUTPATIENT)
Age: 44
Discharge: HOME | End: 2024-05-30
Payer: COMMERCIAL

## 2024-05-30 VITALS
DIASTOLIC BLOOD PRESSURE: 76 MMHG | BODY MASS INDEX: 27.21 KG/M2 | WEIGHT: 135 LBS | OXYGEN SATURATION: 100 % | HEIGHT: 59 IN | RESPIRATION RATE: 16 BRPM | HEART RATE: 64 BPM | TEMPERATURE: 97 F | SYSTOLIC BLOOD PRESSURE: 119 MMHG

## 2024-05-30 DIAGNOSIS — S69.92XA INJURY OF LEFT WRIST, INITIAL ENCOUNTER: Primary | ICD-10-CM

## 2024-05-30 DIAGNOSIS — G56.02 CARPAL TUNNEL SYNDROME OF LEFT WRIST: ICD-10-CM

## 2024-05-30 PROCEDURE — L3908 WHO COCK-UP NONMOLDE PRE OTS: HCPCS | Performed by: NURSE PRACTITIONER

## 2024-05-30 PROCEDURE — 99203 OFFICE O/P NEW LOW 30 MIN: CPT | Performed by: NURSE PRACTITIONER

## 2024-05-30 PROCEDURE — 73110 X-RAY EXAM OF WRIST: CPT | Performed by: NURSE PRACTITIONER

## 2024-05-30 RX ORDER — NAPROXEN 500 MG/1
500 TABLET ORAL 2 TIMES DAILY PRN
Qty: 14 TABLET | Refills: 0 | Status: SHIPPED | OUTPATIENT
Start: 2024-05-30 | End: 2024-06-06
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
## 2024-05-30 NOTE — DISCHARGE INSTRUCTIONS
Follow-up with your primary care provider for all of your healthcare needs  Take naproxen twice daily for 7 days  Wear the wrist plan for support and comfort  Ice to the wrist ice treatments on every couple hours  Return to the emergency room for symptoms or concerns
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
## 2024-06-20 PROBLEM — E66.01 MORBID OBESITY (HCC): Status: ACTIVE | Noted: 2017-05-17

## 2024-06-20 PROBLEM — R55 SYNCOPE AND COLLAPSE: Status: ACTIVE | Noted: 2024-06-20

## 2024-07-03 ENCOUNTER — OFFICE VISIT (OUTPATIENT)
Dept: FAMILY MEDICINE CLINIC | Facility: CLINIC | Age: 44
End: 2024-07-03
Payer: COMMERCIAL

## 2024-07-03 VITALS
TEMPERATURE: 97 F | RESPIRATION RATE: 16 BRPM | DIASTOLIC BLOOD PRESSURE: 70 MMHG | HEIGHT: 59 IN | SYSTOLIC BLOOD PRESSURE: 122 MMHG | WEIGHT: 140.19 LBS | OXYGEN SATURATION: 97 % | HEART RATE: 71 BPM | BODY MASS INDEX: 28.26 KG/M2

## 2024-07-03 DIAGNOSIS — Z12.31 ENCOUNTER FOR SCREENING MAMMOGRAM FOR MALIGNANT NEOPLASM OF BREAST: ICD-10-CM

## 2024-07-03 DIAGNOSIS — M25.532 LEFT WRIST PAIN: Primary | ICD-10-CM

## 2024-07-03 PROCEDURE — G2211 COMPLEX E/M VISIT ADD ON: HCPCS | Performed by: FAMILY MEDICINE

## 2024-07-03 PROCEDURE — 99214 OFFICE O/P EST MOD 30 MIN: CPT | Performed by: FAMILY MEDICINE

## 2024-07-03 NOTE — PATIENT INSTRUCTIONS
Start wrist brace most of the day and night for 4-6 wks    Call to schedule occupational therapy    Ibuprofen 400-600mg 2-3 x/day for 3-4 days, then as needed    Followup in 6 wks
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
## 2024-07-18 ENCOUNTER — OFFICE VISIT (OUTPATIENT)
Dept: OCCUPATIONAL MEDICINE | Age: 44
End: 2024-07-18
Attending: FAMILY MEDICINE
Payer: COMMERCIAL

## 2024-07-18 ENCOUNTER — TELEPHONE (OUTPATIENT)
Dept: PHYSICAL THERAPY | Facility: HOSPITAL | Age: 44
End: 2024-07-18

## 2024-07-18 DIAGNOSIS — M25.532 LEFT WRIST PAIN: Primary | ICD-10-CM

## 2024-07-18 PROCEDURE — 97110 THERAPEUTIC EXERCISES: CPT

## 2024-07-18 PROCEDURE — 97165 OT EVAL LOW COMPLEX 30 MIN: CPT
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
## 2024-07-23 ENCOUNTER — OFFICE VISIT (OUTPATIENT)
Dept: OCCUPATIONAL MEDICINE | Age: 44
End: 2024-07-23
Attending: FAMILY MEDICINE
Payer: COMMERCIAL

## 2024-07-23 PROCEDURE — 97110 THERAPEUTIC EXERCISES: CPT

## 2024-07-23 PROCEDURE — 97035 APP MDLTY 1+ULTRASOUND EA 15: CPT

## 2024-07-23 PROCEDURE — 97140 MANUAL THERAPY 1/> REGIONS: CPT

## 2024-07-23 NOTE — PROGRESS NOTES
Diagnosis:   Left wrist pain (M25.532)       Referring Provider: Chad Fallon  Date of Evaluation:    7/18/2024     Precautions:  Latex Allergy, adhesive tape Next MD visit:   none scheduled  Date of Surgery: n/a   Insurance Primary/Secondary: COMMERCIAL / [employer]     # Auth Visits: NA            Subjective: \"It feels ok today. I was in more pain yesterday and I don't know why\"    Pain: 3/10      Objective: wrist flexion=35, extension=50      Assessment: Pt demo limitations in AROM of the wrist at start of session, however improved to be WNL by completion of session.  Pt continues to experience pain at the ulnar side of wrist, primarily volar however did note dorsal pain with full wrist extension this visit.        Goals:  (to be met in 8 visits)  Pt will be independent and compliant with comprehensive HEP to maintain progress achieved in OT  Pt will increase wrist extension to be equal to R side for use while pushing self up  Pt will increase  to be at least 35 lb for use while opening a jar  Pt will report no pain in the wrist during basic self cares  Will continue to upgrade goals PRN    Plan: Continue OT for pain management, ROM, progress strengthening PRN  Date: 7/23/2024  TX#: 2/8 Date:                 TX#: 3/ Date:                 TX#: 4/ Date:                 TX#: 5/ Date:   Tx#: 6/   US 1.2 w/cm2, 2 mhz, 50%, 8 min to volar, ulnar wrist       IASTM through wrist flexors, trigger point strumming       Combined digit flexion holding unweighted object with wrist flexion, extension, RD/UD, pronation/supination  Each x 20       Wrist rocker  Forward/reverse x 20  Circumduction 10/10    Green foam cube  /reshape  Tip pinch       HEP: HEP upgrades in bold     Charges: 1 US, 1 MT, 1 TE       Total Timed Treatment: 45 min  Total Treatment Time: 45 min

## 2024-07-26 ENCOUNTER — OFFICE VISIT (OUTPATIENT)
Dept: OCCUPATIONAL MEDICINE | Age: 44
End: 2024-07-26
Attending: FAMILY MEDICINE
Payer: COMMERCIAL

## 2024-07-26 PROCEDURE — 97140 MANUAL THERAPY 1/> REGIONS: CPT

## 2024-07-26 PROCEDURE — 97110 THERAPEUTIC EXERCISES: CPT

## 2024-07-26 PROCEDURE — 97035 APP MDLTY 1+ULTRASOUND EA 15: CPT

## 2024-07-26 NOTE — PROGRESS NOTES
Diagnosis:   Left wrist pain (M25.532)       Referring Provider: Chad Fallon  Date of Evaluation:    7/18/2024     Precautions:  Latex Allergy, adhesive tape Next MD visit:   none scheduled  Date of Surgery: n/a   Insurance Primary/Secondary: COMMERCIAL / [employer]     # Auth Visits: NA            Subjective: \"It hurts today.\"    Pain: 2/10 at start, 5/10 during strengthening exercises. 3/10 at conclusion      Objective:   wrist flexion=70, extension=50      Assessment: Pt demo improvement in wrist flexion. Pt reported pain increase during strengthening exercises from a 2/10 to a 5/10, but noted the pain decreasing to a 3/10 after stopping. Pt experienced discomfort when completing in hand manipulation and translation from palm to fingertip activities due to pain from wrist flexion. Pt would benefit from continued OT to improve  strength and overall functionality of R hand and wrist for independence.      Goals:  (to be met in 8 visits)  Pt will be independent and compliant with comprehensive HEP to maintain progress achieved in OT  Pt will increase wrist extension to be equal to R side for use while pushing self up  Pt will increase  to be at least 35 lb for use while opening a jar  Pt will report no pain in the wrist during basic self cares  Will continue to upgrade goals PRN    Plan: Continue OT for pain management, ROM, progress strengthening, and in hand manipulation PRN.    This treatment was provided by REJI Liu under the direct and constant direction and supervision of a licensed therapist, who provided consultation regarding skilled judgements, treatment, and assessment of patient care.   Date: 7/23/2024  TX#: 2/8 Date: 7/26/24                TX#: 3/8 Date:                 TX#: 4/ Date:                 TX#: 5/ Date:   Tx#: 6/   US 1.2 w/cm2, 2 mhz, 50%, 8 min to volar, ulnar wrist US 1.2 w/cm2, 2 mhz, 50%, 8 min to volar, ulnar wrist      IASTM through wrist flexors, trigger  point strumming STM through wrist flexors      Combined digit flexion holding unweighted object with wrist flexion, extension, RD/UD, pronation/supination  Each x 20 Wrist flexion/extension with 1 lb weight x 10    Wrist radial/ulnar deviation holding unweighted object x 10      Wrist rocker  Forward/reverse x 20  Circumduction 10/10    Green foam cube  /reshape  Tip pinch Blokus for in hand manipulation, wrist flexion, extension, supination, pronation.      HEP: HEP upgrades in bold     Charges: 1 US, 1 MT, 1 TE       Total Timed Treatment: 45 min  Total Treatment Time: 45 min

## 2024-07-30 ENCOUNTER — OFFICE VISIT (OUTPATIENT)
Dept: OCCUPATIONAL MEDICINE | Age: 44
End: 2024-07-30
Attending: FAMILY MEDICINE
Payer: COMMERCIAL

## 2024-07-30 PROCEDURE — 97110 THERAPEUTIC EXERCISES: CPT

## 2024-07-30 PROCEDURE — 97035 APP MDLTY 1+ULTRASOUND EA 15: CPT

## 2024-07-30 PROCEDURE — 97140 MANUAL THERAPY 1/> REGIONS: CPT

## 2024-07-30 NOTE — PROGRESS NOTES
Diagnosis:   Left wrist pain (M25.532)       Referring Provider: Chad Fallon  Date of Evaluation:    7/18/2024     Precautions:  Latex Allergy, adhesive tape Next MD visit:   none scheduled  Date of Surgery: n/a   Insurance Primary/Secondary: COMMERCIAL / [employer]     # Auth Visits: NA            Subjective: \"It's better today.\"    Pain: 1/10      Objective:   Pt reported pain in volar aspect of ulnar side of wrist and in the volar aspect of the forearm.  Pt reported pain when performing ulnar/radial deviation and wrist flexion.   wrist flexion=83, extension=50      Assessment: Pt demo improvement in wrist flexion. Pain was reported in FCU muscle belly and insertion. Pt reported pain increase to a 4/10 during wrist rocker exercises and radial/ulnar deviation exercise which correlates with use of FCU. Pt was advised to safely use warmth on volar aspect of her forearm as well as at the wrist. Pt would benefit from continued therapy to address strengthening and in hand manipulation for functional tasks.    Goals:  (to be met in 8 visits)  Pt will be independent and compliant with comprehensive HEP to maintain progress achieved in OT  Pt will increase wrist extension to be equal to R side for use while pushing self up  Pt will increase  to be at least 35 lb for use while opening a jar  Pt will report no pain in the wrist during basic self cares  Will continue to upgrade goals PRN    Plan: Continue OT for pain management, ROM, progress strengthening, and in hand manipulation PRN. Focus on strengthening and theraputty as pain allows.    This treatment was provided by REJI Liu under the direct and constant direction and supervision of a licensed therapist, who provided consultation regarding skilled judgements, treatment, and assessment of patient care.   Date: 7/23/2024  TX#: 2/8 Date: 7/26/24                TX#: 3/8 Date: 7/30/24                 TX#: 4/8 Date:                 TX#: 5/ Date:    Tx#: 6/   US 1.2 w/cm2, 2 mhz, 50%, 8 min to volar, ulnar wrist US 1.2 w/cm2, 2 mhz, 50%, 8 min to volar, ulnar wrist US 1.2 w/cm2, 2 mhz, 50%, 8 min to volar, ulnar wrist     IASTM through wrist flexors, trigger point strumming STM through wrist flexors STM through FCU     Combined digit flexion holding unweighted object with wrist flexion, extension, RD/UD, pronation/supination  Each x 20 Wrist flexion/extension with 1 lb weight x 10    Wrist radial/ulnar deviation holding unweighted object x 10 Radial/Ulnar deviation with towel x 20  Animal beads for in hand manipulation and gripping with D3-D5.     Wrist rocker  Forward/reverse x 20  Circumduction 10/10    Green foam cube  /reshape  Tip pinch Blokus for in hand manipulation, wrist flexion, extension, supination, pronation. Wrist rocker all exercises x 20  - Forward/reverse  - Diagonal both directions x 20  - Flexion partial circumference x 20  - Extension partial circumference x 20     HEP: HEP upgrades in bold     Charges: 1 US, 1 MT, 1 TE       Total Timed Treatment: 45 min  Total Treatment Time: 45 min

## 2024-08-02 ENCOUNTER — OFFICE VISIT (OUTPATIENT)
Dept: OCCUPATIONAL MEDICINE | Age: 44
End: 2024-08-02
Attending: FAMILY MEDICINE
Payer: COMMERCIAL

## 2024-08-02 PROCEDURE — 97110 THERAPEUTIC EXERCISES: CPT

## 2024-08-02 PROCEDURE — 97035 APP MDLTY 1+ULTRASOUND EA 15: CPT

## 2024-08-02 PROCEDURE — 97140 MANUAL THERAPY 1/> REGIONS: CPT

## 2024-08-02 NOTE — PROGRESS NOTES
Diagnosis:   Left wrist pain (M25.532)       Referring Provider: Chad Fallon  Date of Evaluation:    7/18/2024     Precautions:  Latex Allergy, adhesive tape Next MD visit:   none scheduled  Date of Surgery: n/a   Insurance Primary/Secondary: COMMER[employer] / [employer]     # Auth Visits: NA            Subjective: \"It's feels really good today.\"    Pain: 0/10 at start, 4/10 at conclusion.      Objective:   Pt reported pain localized in the volar aspect of the wrist while performing ulnar deviation/radial deviation and wrist flexion.  wrist flexion=83, extension=50      Assessment: Pt reported no pain following application of heat and STM. Performance of PREs caused pain to increase to 4/10, however decreased upon completion of activity. /pinch with D3-D5 produced increased pain. Pt would benefit from continued therapy for pain management and wrist strengthening as tolerated for functional independence.      Goals:  (to be met in 8 visits)  Pt will be independent and compliant with comprehensive HEP to maintain progress achieved in OT  Pt will increase wrist extension to be equal to R side for use while pushing self up: progressing  Pt will increase  to be at least 35 lb for use while opening a jar: progressing  Pt will report no pain in the wrist during basic self cares: progressing  Will continue to upgrade goals PRN    Plan: Continue OT for pain management, ROM, progress strengthening, and in hand manipulation PRN. Focus on wrist extension and strengthening. Further assessment of wrist/forearm pain.    This treatment was provided by REJI Liu under the direct and constant direction and supervision of a licensed therapist, who provided consultation regarding skilled judgements, treatment, and assessment of patient care.   Date: 7/23/2024  TX#: 2/8 Date: 7/26/24                TX#: 3/8 Date: 7/30/24                 TX#: 4/8 Date: 8/2/24                TX#: 5/8 Date:   Tx#: 6/   US 1.2  w/cm2, 2 mhz, 50%, 8 min to volar, ulnar wrist US 1.2 w/cm2, 2 mhz, 50%, 8 min to volar, ulnar wrist US 1.2 w/cm2, 2 mhz, 50%, 8 min to volar, ulnar wrist US 1.2 w/cm2, 2 mhz, 50%, 8 min to volar ulnar wrist and volar forearm.      IASTM through wrist flexors, trigger point strumming STM through wrist flexors STM through FCU IASTM through FCU with focus on FCU muscle belly.      Combined digit flexion holding unweighted object with wrist flexion, extension, RD/UD, pronation/supination  Each x 20 Wrist flexion/extension with 1 lb weight x 10    Wrist radial/ulnar deviation holding unweighted object x 10 Radial/Ulnar deviation with towel x 20  Animal beads for in hand manipulation and gripping with D3-D5. Wrist flexion/extension with 1 lb weight x 20  Wrist ulnar/radial deviation with 1 lb weight x 10  Supination/pronation with 1 lb weight x 10  Supination/pronation with 7 ounce hammer x 10      Wrist rocker  Forward/reverse x 20  Circumduction 10/10    Green foam cube  /reshape  Tip pinch Blokus for in hand manipulation, wrist flexion, extension, supination, pronation. Wrist rocker all exercises x 20  - Forward/reverse  - Diagonal both directions x 20  - Flexion partial circumference x 20  - Extension partial circumference x 20 Yellow digiflex full  x 20, individual fingers x 10    Tan Theraputty exercises  - Fully   - Lateral pinch  - Roll and pinch with all fingers  - Tripod grasp and pull  - D3-D5 grasp and pull      HEP: HEP upgrades in bold     Charges: 1 US, 1 MT, 1 TE       Total Timed Treatment: 45 min  Total Treatment Time: 45 min

## 2024-08-06 ENCOUNTER — OFFICE VISIT (OUTPATIENT)
Dept: OCCUPATIONAL MEDICINE | Age: 44
End: 2024-08-06
Attending: FAMILY MEDICINE
Payer: COMMERCIAL

## 2024-08-06 PROCEDURE — 97110 THERAPEUTIC EXERCISES: CPT

## 2024-08-06 PROCEDURE — 97035 APP MDLTY 1+ULTRASOUND EA 15: CPT

## 2024-08-06 NOTE — PROGRESS NOTES
Diagnosis:   Left wrist pain (M25.532)       Referring Provider: Chad Fallon  Date of Evaluation:    7/18/2024     Precautions:  Latex Allergy, adhesive tape Next MD visit:   none scheduled  Date of Surgery: n/a   Insurance Primary/Secondary: [employer] / [employer]     # Auth Visits: NA            Subjective: \"I'm feeling a lot better.\" \"It hurts when I try to open a jar.\"    Pain: 3/10      Objective:   Tinel's Test (+) at cubital tunnel   Pt reported pain localized in the volar aspect of the wrist while performing ulnar deviation/radial deviation, wrist flexion and gripping.  wrist extension = 52      Assessment: Wrist extension increased with no pain reported. Pain was reported during wrist flexion, ulnar/radial deviation, and gripping which increased to a 4/10 initiating the action but returned to 3/10 when followed through. Tinel's test was performed on L ulnar nerve with a + sign for potential beginnings of cubital tunnel, and pt was educated on cubital tunnel. Recommendations were given on improving ergonomics and sleep positioning for preventing further ulnar nerve agitation, and to safely begin lifting heavier objects as tolerated for improved  strength. Pt would benefit from continued therapy to manage pain and increase  strength as tolerated for functional activity.      Goals:  (to be met in 8 visits)  Pt will be independent and compliant with comprehensive HEP to maintain progress achieved in OT  Pt will increase wrist extension to be equal to R side for use while pushing self up: progressing  Pt will increase  to be at least 35 lb for use while opening a jar: progressing  Pt will report no pain in the wrist during basic self cares: progressing  Will continue to upgrade goals PRN    Plan: Continue OT for pain management, ROM, progress strengthening, and in hand manipulation PRN. Focus on wrist extension and strengthening as tolerated.    This treatment was provided by Lea  REJI Gaviria under the direct and constant direction and supervision of a licensed therapist, who provided consultation regarding skilled judgements, treatment, and assessment of patient care.   Date: 7/26/24                TX#: 3/8 Date: 7/30/24                 TX#: 4/8 Date: 8/2/24                TX#: 5/8 Date: 8/5/24  Tx#: 6/8    US 1.2 w/cm2, 2 mhz, 50%, 8 min to volar, ulnar wrist US 1.2 w/cm2, 2 mhz, 50%, 8 min to volar, ulnar wrist US 1.2 w/cm2, 2 mhz, 50%, 8 min to volar ulnar wrist and volar forearm.   Tinel's Test (+)  Education on cubital tunnel.    US 1.2 w/cm2, 2 mhz, 50%, 8 min to volar ulnar wrist and volar forearm.    STM through wrist flexors STM through FCU IASTM through FCU with focus on FCU muscle belly.   STM through FCU and ulnar side of wrist.    Wrist flexion/extension with 1 lb weight x 10    Wrist radial/ulnar deviation holding unweighted object x 10 Radial/Ulnar deviation with towel x 20  Animal beads for in hand manipulation and gripping with D3-D5. Wrist flexion/extension with 1 lb weight x 20  Wrist ulnar/radial deviation with 1 lb weight x 10  Supination/pronation with 1 lb weight x 10  Supination/pronation with 7 ounce hammer x 10   Black gripper lvl 1 to  blocks:  - D3-D5    - Full     Grasp with D3-D5 cones and reach for elbow extension      Blokus for in hand manipulation, wrist flexion, extension, supination, pronation. Wrist rocker all exercises x 20  - Forward/reverse  - Diagonal both directions x 20  - Flexion partial circumference x 20  - Extension partial circumference x 20 Yellow digiflex full  x 20, individual fingers x 10    Tan Theraputty exercises  - Fully   - Lateral pinch  - Roll and pinch with all fingers  - Tripod grasp and pull  - D3-D5 grasp and pull   Beige Power Web  - Isometric supination/pronation (3/10 pain experienced with ulnar/radial deviation with concentric movement)  - concentric wrist flexion/extension x 20 both directions  -  Ulnar/Radial deviation both direction x 10    HEP: HEP upgrades in bold     Charges: 1 US, 2 TE       Total Timed Treatment: 45 min  Total Treatment Time: 45 min

## 2024-08-13 ENCOUNTER — OFFICE VISIT (OUTPATIENT)
Dept: FAMILY MEDICINE CLINIC | Facility: CLINIC | Age: 44
End: 2024-08-13
Payer: COMMERCIAL

## 2024-08-13 VITALS
WEIGHT: 153 LBS | DIASTOLIC BLOOD PRESSURE: 66 MMHG | SYSTOLIC BLOOD PRESSURE: 118 MMHG | RESPIRATION RATE: 16 BRPM | BODY MASS INDEX: 30.84 KG/M2 | HEART RATE: 62 BPM | TEMPERATURE: 98 F | OXYGEN SATURATION: 97 % | HEIGHT: 59 IN

## 2024-08-13 DIAGNOSIS — F33.1 MAJOR DEPRESSIVE DISORDER, RECURRENT, MODERATE (HCC): ICD-10-CM

## 2024-08-13 DIAGNOSIS — E66.09 CLASS 2 OBESITY DUE TO EXCESS CALORIES WITHOUT SERIOUS COMORBIDITY WITH BODY MASS INDEX (BMI) OF 35.0 TO 35.9 IN ADULT: ICD-10-CM

## 2024-08-13 DIAGNOSIS — R30.0 DYSURIA: Primary | ICD-10-CM

## 2024-08-13 DIAGNOSIS — F41.9 ANXIETY: ICD-10-CM

## 2024-08-13 PROCEDURE — 87086 URINE CULTURE/COLONY COUNT: CPT | Performed by: FAMILY MEDICINE

## 2024-08-13 PROCEDURE — G2211 COMPLEX E/M VISIT ADD ON: HCPCS | Performed by: FAMILY MEDICINE

## 2024-08-13 PROCEDURE — 99214 OFFICE O/P EST MOD 30 MIN: CPT | Performed by: FAMILY MEDICINE

## 2024-08-13 RX ORDER — NITROFURANTOIN 25; 75 MG/1; MG/1
100 CAPSULE ORAL 2 TIMES DAILY
Qty: 14 CAPSULE | Refills: 0 | Status: SHIPPED | OUTPATIENT
Start: 2024-08-13

## 2024-08-13 NOTE — PATIENT INSTRUCTIONS
Start nitrofurantoin 2x/day for 1 week    Drink lots of water    We will touch base in 2-3 days after culture results are back    Let us know if symptoms worsening in meantime    Followup in 2 months for wellness, sooner if needed

## 2024-08-15 NOTE — PROGRESS NOTES
Lauren Berry is a 44 year old female here for   Chief Complaint   Patient presents with    UTI     X1 day burning and pain         HPI:       1. Dysuria  -started yesterday  -associated with burning  -feels like past UTI symptoms  -no fevers  -no back pain    2. Major depressive disorder, recurrent, moderate (HCC)  3. Anxiety  -stable    4. Class 2 obesity due to excess calories without serious comorbidity with body mass index (BMI) of 35.0 to 35.9 in adult  -continue to work on it        HISTORY:  Past Medical History:    Abdominal pain    Anxiety    Depression    Diarrhea, unspecified    Fatigue    Food intolerance    Irregular bowel habits    Leaking of urine    On going    Nasal congestion    Nausea    Obesity    Uncomfortable fullness after meals    Due to gastric bypass sleeve    Vomiting    Wears glasses      Past Surgical History:   Procedure Laterality Date    Gastric bypass,obesity,sb reconstruc  2018    Hysterectomy  2021    Lap gastric bypass/sara-en-y        1999    Total abdom hysterectomy        Family History   Problem Relation Age of Onset    Diabetes Maternal Grandmother     Other (Other [Other]) Paternal Grandfather         Liver disease      Social History:   Social History     Socioeconomic History    Marital status:    Tobacco Use    Smoking status: Never     Passive exposure: Never    Smokeless tobacco: Never   Vaping Use    Vaping status: Never Used   Substance and Sexual Activity    Alcohol use: Yes     Alcohol/week: 1.0 standard drink of alcohol     Types: 1 Standard drinks or equivalent per week     Comment: Socially    Drug use: No    Sexual activity: Yes     Partners: Male     Birth control/protection: Hysterectomy   Other Topics Concern    Caffeine Concern Yes     Comment: 1 cup of coffee daily    Stress Concern No    Weight Concern Yes     Comment: Not able to lose weight    Special Diet No    Exercise Yes     Comment: 6-7 x weekly    Seat Belt Yes      Social Determinants of Health     Transportation Needs: No Transportation Needs (7/3/2020)    Received from [email or web address], [facility]    PRAPARE - Transportation     Lack of Transportation (Medical): No     Lack of Transportation (Non-Medical): No   Physical Activity: Insufficiently Active (12/2/2021)    Received from [facility], [facility]    Exercise Vital Sign     Days of Exercise per Week: 2 days     Minutes of Exercise per Session: 30 min   Stress: High Risk (12/2/2021)    Received from [facility], [facility]    Stress     Stress is when someone feels tense, nervous, anxious, or can't sleep at night because their mind is troubled. How stressed are you? : Very much        Medications (Active prior to today's visit):  Current Outpatient Medications   Medication Sig Dispense Refill    nitrofurantoin monohydrate macro 100 MG Oral Cap Take 1 capsule (100 mg total) by mouth 2 (two) times daily. 14 capsule 0    famotidine (PEPCID) 20 MG Oral Tab Take 1 tablet (20 mg total) by mouth 2 (two) times daily as needed for Heartburn. 180 tablet 0    Multiple Vitamin (MULTI-VITAMIN) Oral Tab Take 1 tablet by mouth daily.         Allergies:  Allergies   Allergen Reactions    Shellfish-Derived Products HIVES    Adhesive Tape RASH    Adhesive Tape (Rosins) RASH    Latex RASH         ROS:   See HPI for relevant ROS    --GEN: No other complaints  --HEENT: No other complaints  --RESP: No other complaints  --CV: No other complaints  --GI: No other complaints  --MSK: No other complaints    All other systems reviewed are negative    PHYSICAL EXAM:   /66 (BP Location: Left arm, Patient Position: Sitting, Cuff Size: adult)   Pulse 62   Temp 97.8 °F (36.6 °C) (Temporal)   Resp 16   Ht 4' 11\" (1.499 m)   Wt 153 lb (69.4 kg)   LMP 10/19/2021   SpO2 97%   BMI 30.90 kg/m²     Gen: NAD  HEENT: NCAT, pupils equal and round  Pulm: CTAB, no wheezing  CV:  RRR  Ext: full ROM  Psych: normal affect     ASSESSMENT/PLAN:     1. Dysuria  -start macrobid  -check culture  -f/u prn    - Urine Culture, Routine [E]; Future  - Urine Culture, Routine [E]    2. Major depressive disorder, recurrent, moderate (HCC)  3. Anxiety  -stable, will continue to monitor    4. Class 2 obesity due to excess calories without serious comorbidity with body mass index (BMI) of 35.0 to 35.9 in adult  -c/w lifestyle changes        Chronic Conditions:    No problem-specific Assessment & Plan notes found for this encounter.       Health Maintenance:  Health Maintenance   Topic Date Due    COVID-19 Vaccine (4 - 2023-24 season) 09/01/2023    Colorectal Cancer Screening  04/15/2024    Mammogram  08/25/2024    Annual Physical  09/09/2024    Influenza Vaccine (1) 10/01/2024    DTaP,Tdap,and Td Vaccines (4 - Td or Tdap) 08/18/2031    Annual Depression Screening  Completed    Pneumococcal Vaccine: Birth to 64yrs  Aged Out               The patient is asked to return in 3 months.    Orders This Visit:  Orders Placed This Encounter   Procedures    Urine Culture, Routine [E]       Meds This Visit:  Requested Prescriptions     Signed Prescriptions Disp Refills    nitrofurantoin monohydrate macro 100 MG Oral Cap 14 capsule 0     Sig: Take 1 capsule (100 mg total) by mouth 2 (two) times daily.       Imaging & Referrals:  None     PATRICK PATEL MD    I spent a total of 30 minutes, more than half of which was spent counseling/coordinating care regarding uti

## 2024-08-16 ENCOUNTER — APPOINTMENT (OUTPATIENT)
Dept: OCCUPATIONAL MEDICINE | Age: 44
End: 2024-08-16
Attending: FAMILY MEDICINE
Payer: COMMERCIAL

## 2024-08-17 ENCOUNTER — HOSPITAL ENCOUNTER (OUTPATIENT)
Dept: MAMMOGRAPHY | Age: 44
Discharge: HOME | End: 2024-08-17
Attending: FAMILY MEDICINE
Payer: COMMERCIAL

## 2024-08-17 DIAGNOSIS — Z12.31 ENCOUNTER FOR SCREENING MAMMOGRAM FOR MALIGNANT NEOPLASM OF BREAST: ICD-10-CM

## 2024-08-17 PROCEDURE — 77067 SCR MAMMO BI INCL CAD: CPT | Performed by: FAMILY MEDICINE

## 2024-08-17 PROCEDURE — 77063 BREAST TOMOSYNTHESIS BI: CPT | Performed by: FAMILY MEDICINE

## 2024-08-20 ENCOUNTER — OFFICE VISIT (OUTPATIENT)
Dept: OCCUPATIONAL MEDICINE | Age: 44
End: 2024-08-20
Attending: FAMILY MEDICINE
Payer: COMMERCIAL

## 2024-08-20 PROCEDURE — 97110 THERAPEUTIC EXERCISES: CPT

## 2024-08-20 PROCEDURE — 97035 APP MDLTY 1+ULTRASOUND EA 15: CPT

## 2024-08-20 NOTE — PROGRESS NOTES
Diagnosis:   Left wrist pain (M25.532)       Referring Provider: Chad Fallon  Date of Evaluation:    7/18/2024     Precautions:  Latex Allergy, adhesive tape Next MD visit:   none scheduled  Date of Surgery: n/a   Insurance Primary/Secondary: [employer] / [employer]     # Auth Visits: NA            Subjective: \"I'm feeling better. No pain except when I'm picking up something heavy.\" Pt indicates no adhesive tape allergy, only latex allergy.    Pain: 0/10, 4/10 at AROM end range with resistance.      Objective:   Pt reported pain in ulnar side of wrist when performing ulnar deviation and flexion/extension with resistance at the end range.  Pt reported pain when resistance was applied to supination/pronation.      Assessment: Pain continues to be reported at end ranges with applied resistance during ulnar deviation, wrist flexion/extension, and supination/pronation. Pt reports pain is localized in ulnar aspect of wrist, primarily around TFCC and FCU insertion. Kinesiotape was applied to the wrist at the TFCC to support the ulna and skin mobilization and fascia correction for FCU and decrease pain when performing activities. Pt was instructed to remove tape if it increased pain or caused a reaction. Pt was also instructed to safely continue increasing weight lifted at home. Pt to continue with OT to improve wrist and  strengthen, and manage pain to increase functional independence.      Goals:  (to be met in 8 visits)  Pt will be independent and compliant with comprehensive HEP to maintain progress achieved in OT  Pt will increase wrist extension to be equal to R side for use while pushing self up: progressing  Pt will increase  to be at least 35 lb for use while opening a jar: progressing  Pt will report no pain in the wrist during basic self cares: progressing  Will continue to upgrade goals PRN    Plan: Continue OT for pain management, ROM, progress strengthening, and in hand manipulation PRN.  Focus wrist/ strengthening and wrist stabilization. Progress note    This treatment was provided by REJI Liu under the direct and constant direction and supervision of a licensed therapist, who provided consultation regarding skilled judgements, treatment, and assessment of patient care.   Date: 7/26/24                TX#: 3/8 Date: 7/30/24                 TX#: 4/8 Date: 8/2/24                TX#: 5/8 Date: 8/5/24  Tx#: 6/8 Date: 8/20/24  Tx#: 7/8    US 1.2 w/cm2, 2 mhz, 50%, 8 min to volar, ulnar wrist US 1.2 w/cm2, 2 mhz, 50%, 8 min to volar, ulnar wrist US 1.2 w/cm2, 2 mhz, 50%, 8 min to volar ulnar wrist and volar forearm.   Tinel's Test (+)  Education on cubital tunnel.    US 1.2 w/cm2, 2 mhz, 50%, 8 min to volar ulnar wrist and volar forearm. US 1.2 w/cm2, 2 mhz, 50%, 8 min to volar ulnar wrist and volar forearm.    STM through wrist flexors STM through FCU IASTM through FCU with focus on FCU muscle belly.   STM through FCU and ulnar side of wrist. Wrist flexion/extension with 2 lb weight x20  Wrist ulnar/radial deviation with 2 lb weight x 20  Supination/pronation with 2 lb weight x 15        Wrist flexion/extension with 1 lb weight x 10    Wrist radial/ulnar deviation holding unweighted object x 10 Radial/Ulnar deviation with towel x 20  Animal beads for in hand manipulation and gripping with D3-D5. Wrist flexion/extension with 1 lb weight x 20  Wrist ulnar/radial deviation with 1 lb weight x 10  Supination/pronation with 1 lb weight x 10  Supination/pronation with 7 ounce hammer x 10   Black gripper lvl 1 to  blocks:  - D3-D5    - Full     Grasp with D3-D5 cones and reach for elbow extension   Red Power Web  - wrist flexion/extension x 20 both directions  - Supination with ulnar deviation x 20  - Pronation with radial deviation x 20    Blokus for in hand manipulation, wrist flexion, extension, supination, pronation. Wrist rocker all exercises x 20  - Forward/reverse  - Diagonal  both directions x 20  - Flexion partial circumference x 20  - Extension partial circumference x 20 Yellow digiflex full  x 20, individual fingers x 10    Tan Theraputty exercises  - Fully   - Lateral pinch  - Roll and pinch with all fingers  - Tripod grasp and pull  - D3-D5 grasp and pull   Beige Power Web  - Isometric supination/pronation (3/10 pain experienced with ulnar/radial deviation with concentric movement)  - concentric wrist flexion/extension x 20 both directions  - Ulnar/Radial deviation both direction x 10 Pull off velcro blocks with red clothes pin for lateral  strength and wrist strength.    Kinesiotape applied to wrist around TFCC to support ulna and at FCU insertion for skin mobilization and fascia correction     HEP: HEP upgrades in bold     Charges: 1 US, 2 TE       Total Timed Treatment: 45 min  Total Treatment Time: 45 min

## 2024-08-26 ENCOUNTER — OFFICE VISIT (OUTPATIENT)
Dept: OCCUPATIONAL MEDICINE | Age: 44
End: 2024-08-26
Attending: FAMILY MEDICINE
Payer: COMMERCIAL

## 2024-08-26 PROCEDURE — 97530 THERAPEUTIC ACTIVITIES: CPT

## 2024-08-26 PROCEDURE — 97110 THERAPEUTIC EXERCISES: CPT

## 2024-08-26 NOTE — PROGRESS NOTES
Diagnosis:   Left wrist pain (M25.532)       Referring Provider: Chad Fallon  Date of Evaluation:    7/18/2024     Precautions:  Latex Allergy, adhesive tape Next MD visit:   none scheduled  Date of Surgery: n/a   Insurance Primary/Secondary: [employer] / [employer]     # Auth Visits: NA           Progress Summary  Pt has attended 8 visits in Occupational Therapy.      Subjective: \"I'm definitely doing better. It still hurts and I can't lift weights still, but I definitely know it's doing better.\" \"The tape really helped a lot.\"    Pain: 0/10, 4/10 following wrist extension exercises.      Objective:   Pt reported pain in ulnar side of wrist when performing ulnar deviation and flexion/extension with resistance at the end range.  Pt reported pain when resistance was applied to supination/pronation.    L Wrist Extension: 50, 60 following PROM    : 24  Lateral: 14  Tripod: 10      Assessment: Pain continues to be experienced with the end range of extension and with ulnar/radial deviation with resistance localized in the ulnar aspect of wrist, primarily around TFCC and FCU insertion. Pt demo improved  and pinch strength and reported with no reported pain. Education was provided on safely performing PROM for L wrist extension. Continued therapy would benefit the patient for increased weight tolerance and pain management for greater independence.      Goals:  (to be met in 13 visits)  Pt will be independent and compliant with comprehensive HEP to maintain progress achieved in OT  Pt will increase wrist extension to be equal to R side for use while pushing self up: progressing  Pt will increase  to be at least 35 lb for use while opening a jar: progressing  Pt will report no pain in the wrist during basic self cares: progressing  Pt will report no more than 1/10 pain when lifting weights as tolerated for leisure activities.  Will continue to upgrade goals PRN    QuickDASH Outcome Score  Score:  20.45 % (8/26/2024  4:40 PM)      Plan: Continue skilled Occupational Therapy 1 x/week or a total of 13 visits over a 90 day period. Treatment will include: Manual Therapy, Self-Care Home Management, Therapeutic Activities, Therapeutic Exercise, Home Exercise Program instruction, and US, WP, splinting, electrical stim .  Focus on strengthening as tolerated.    Patient/Family/Caregiver was advised of these findings, precautions, and treatment options and has agreed to actively participate in planning and for this course of care.    Thank you for your referral. If you have any questions, please contact me at Dept: [phone].    Sincerely,  Electronically signed by therapist: Lea Gaviria     Physician's certification required:  Yes  Please co-sign or sign and return this letter via fax as soon as possible to [phone].   I certify the need for these services furnished under this plan of treatment and while under my care.    X___________________________________________________ Date____________________    Certification From: 8/26/2024  To:11/24/2024        This treatment was provided by REJI Liu under the direct and constant direction and supervision of a licensed therapist, who provided consultation regarding skilled judgements, treatment, and assessment of patient care.   Date: 7/26/24                TX#: 3/8 Date: 7/30/24                 TX#: 4/8 Date: 8/2/24                TX#: 5/8 Date: 8/5/24  Tx#: 6/8 Date: 8/20/24  Tx#: 7/8 Date: 8/2624  Tx#: 8/8   US 1.2 w/cm2, 2 mhz, 50%, 8 min to volar, ulnar wrist US 1.2 w/cm2, 2 mhz, 50%, 8 min to volar, ulnar wrist US 1.2 w/cm2, 2 mhz, 50%, 8 min to volar ulnar wrist and volar forearm.   Tinel's Test (+)  Education on cubital tunnel.    US 1.2 w/cm2, 2 mhz, 50%, 8 min to volar ulnar wrist and volar forearm. US 1.2 w/cm2, 2 mhz, 50%, 8 min to volar ulnar wrist and volar forearm. Reassessment  - /Pinch strength  - Wrist Extension    PROM to wrist  extension with Red/Beige Power Web     STM through wrist flexors STM through FCU IASTM through FCU with focus on FCU muscle belly.   STM through FCU and ulnar side of wrist. Wrist flexion/extension with 2 lb weight x20  Wrist ulnar/radial deviation with 2 lb weight x 20  Supination/pronation with 2 lb weight x 15     Red Power Web  - Full  x 20    Beige Power Web  - Supination  - Pronation   Wrist flexion/extension with 1 lb weight x 10    Wrist radial/ulnar deviation holding unweighted object x 10 Radial/Ulnar deviation with towel x 20  Animal beads for in hand manipulation and gripping with D3-D5. Wrist flexion/extension with 1 lb weight x 20  Wrist ulnar/radial deviation with 1 lb weight x 10  Supination/pronation with 1 lb weight x 10  Supination/pronation with 7 ounce hammer x 10   Black gripper lvl 1 to  blocks:  - D3-D5    - Full     Grasp with D3-D5 cones and reach for elbow extension   Red Power Web  - wrist flexion/extension x 20 both directions  - Supination with ulnar deviation x 20  - Pronation with radial deviation x 20 [place] Therapy Paw  - Full  x 20  - Pull x 20    Wrist Maze for proprioception and wrist stabilization.    Wrist ROM and in hand manipulation with connect four pieces.     Blokus for in hand manipulation, wrist flexion, extension, supination, pronation. Wrist rocker all exercises x 20  - Forward/reverse  - Diagonal both directions x 20  - Flexion partial circumference x 20  - Extension partial circumference x 20 Yellow digiflex full  x 20, individual fingers x 10    Tan Theraputty exercises  - Fully   - Lateral pinch  - Roll and pinch with all fingers  - Tripod grasp and pull  - D3-D5 grasp and pull   Beige Power Web  - Isometric supination/pronation (3/10 pain experienced with ulnar/radial deviation with concentric movement)  - concentric wrist flexion/extension x 20 both directions  - Ulnar/Radial deviation both direction x 10 Pull off velcro blocks with  red clothes pin for lateral  strength and wrist strength.    Kinesiotape applied to wrist around TFCC to support ulna and at FCU insertion for skin mobilization and fascia correction  Kinesiotape applied to wrist around TFCC to support ulna and at FCU insertion for skin mobilization and fascia correction.    Wrist extension with relaxed wrist and wall.  Prayer stretch for wrist extension.    HEP: HEP upgrades in bold     Charges: 1 TA, 2 TE       Total Timed Treatment: 45 min  Total Treatment Time: 45 min

## 2024-09-06 ENCOUNTER — TELEPHONE (OUTPATIENT)
Dept: PHYSICAL THERAPY | Facility: HOSPITAL | Age: 44
End: 2024-09-06

## 2024-09-06 ENCOUNTER — APPOINTMENT (OUTPATIENT)
Dept: OCCUPATIONAL MEDICINE | Age: 44
End: 2024-09-06
Attending: FAMILY MEDICINE
Payer: COMMERCIAL

## 2024-09-09 ENCOUNTER — TELEPHONE (OUTPATIENT)
Dept: OCCUPATIONAL MEDICINE | Age: 44
End: 2024-09-09

## 2024-09-10 ENCOUNTER — APPOINTMENT (OUTPATIENT)
Dept: OCCUPATIONAL MEDICINE | Age: 44
End: 2024-09-10
Attending: FAMILY MEDICINE
Payer: COMMERCIAL

## 2024-09-11 ENCOUNTER — TELEPHONE (OUTPATIENT)
Dept: PHYSICAL THERAPY | Facility: HOSPITAL | Age: 44
End: 2024-09-11

## 2024-09-17 ENCOUNTER — OFFICE VISIT (OUTPATIENT)
Dept: OCCUPATIONAL MEDICINE | Age: 44
End: 2024-09-17
Attending: FAMILY MEDICINE
Payer: COMMERCIAL

## 2024-09-17 PROCEDURE — 97110 THERAPEUTIC EXERCISES: CPT

## 2024-09-17 NOTE — PROGRESS NOTES
Diagnosis:   Left wrist pain (M25.532)       Referring Provider: Chad Fallon  Date of Evaluation:    7/18/2024     Precautions:  Latex Allergy, adhesive tape Next MD visit:   none scheduled  Date of Surgery: n/a   Insurance Primary/Secondary: [employer] / [employer]     # Auth Visits: Progress note signed 8/27 to continue          Progress Summary  Pt has attended 9 visits in Occupational Therapy.      Subjective: \"I'm feeling a lot better.  I haven't had pain for a few days.\"  Pain: 0/10      Objective:       L Wrist Extension: 57, R wrist extension=58    : L=38, R=44  Lateral: 14  Tripod: 12      Assessment: Pain well controlled with all exercises performed in clinic.  Pt has generally not experienced pain over the past several days with most activities, however pain present intermittently with heavier tasks. Updated HEP with theraband which pt was able to independently return demo in clinic.       Goals:  (to be met in 13 visits)  Pt will be independent and compliant with comprehensive HEP to maintain progress achieved in OT  Pt will increase wrist extension to be equal to R side for use while pushing self up:met  Pt will increase  to be at least 35 lb for use while opening a jar: met  Pt will report no pain in the wrist during basic self cares: met  Pt will report no more than 1/10 pain when lifting weights as tolerated for leisure activities: progressing  Will continue to upgrade goals PRN    Post QuickDASH Outcome Score  Post Score: 6.82 % (9/17/2024  7:59 AM)    13.63 % improvement        Plan: Continue skilled Occupational Therapy 1 x/week or a total of 13 visits over a 90 day period. Treatment will include: Manual Therapy, Self-Care Home Management, Therapeutic Activities, Therapeutic Exercise, Home Exercise Program instruction, and US, WP, splinting, electrical stim .  Focus on strengthening as tolerated.    Patient/Family/Caregiver was advised of these findings, precautions, and  treatment options and has agreed to actively participate in planning and for this course of care.    Thank you for your referral. If you have any questions, please contact me at Dept: [phone].    Sincerely,  Electronically signed by therapist: Abi Black OT     Physician's certification required:  Yes  Please co-sign or sign and return this letter via fax as soon as possible to [phone].   I certify the need for these services furnished under this plan of treatment and while under my care.    X___________________________________________________ Date____________________    Certification From: 8/26/2024  To:11/24/2024        Date: 8/2/24                TX#: 5/8 Date: 8/5/24  Tx#: 6/8 Date: 8/20/24  Tx#: 7/8 Date: 8/2624  Tx#: 8/8 9/17/24  Tx: 9   US 1.2 w/cm2, 2 mhz, 50%, 8 min to volar ulnar wrist and volar forearm.   Tinel's Test (+)  Education on cubital tunnel.    US 1.2 w/cm2, 2 mhz, 50%, 8 min to volar ulnar wrist and volar forearm. US 1.2 w/cm2, 2 mhz, 50%, 8 min to volar ulnar wrist and volar forearm. Reassessment  - /Pinch strength  - Wrist Extension    PROM to wrist extension with Red/Beige Power Web   Wall weight bands  Medium  High row  Low row  Triceps extension  Bicep curl  Each x 20   IASTM through FCU with focus on FCU muscle belly.   STM through FCU and ulnar side of wrist. Wrist flexion/extension with 2 lb weight x20  Wrist ulnar/radial deviation with 2 lb weight x 20  Supination/pronation with 2 lb weight x 15     Red Power Web  - Full  x 20    Beige Power Web  - Supination  - Pronation Red theraband  Wrist flexion  Wrist extension  Pronation  Supination  Each x 20   Wrist flexion/extension with 1 lb weight x 20  Wrist ulnar/radial deviation with 1 lb weight x 10  Supination/pronation with 1 lb weight x 10  Supination/pronation with 7 ounce hammer x 10   Black gripper lvl 1 to  blocks:  - D3-D5    - Full     Grasp with D3-D5 cones and reach for elbow  extension   Red Power Web  - wrist flexion/extension x 20 both directions  - Supination with ulnar deviation x 20  - Pronation with radial deviation x 20 [place] Therapy Paw  - Full  x 20  - Pull x 20    Wrist Maze for proprioception and wrist stabilization.    Wrist ROM and in hand manipulation with connect four pieces.   Body blade  45 sec x 4   Yellow digiflex full  x 20, individual fingers x 10    Tan Theraputty exercises  - Fully   - Lateral pinch  - Roll and pinch with all fingers  - Tripod grasp and pull  - D3-D5 grasp and pull   Beige Power Web  - Isometric supination/pronation (3/10 pain experienced with ulnar/radial deviation with concentric movement)  - concentric wrist flexion/extension x 20 both directions  - Ulnar/Radial deviation both direction x 10 Pull off velcro blocks with red clothes pin for lateral  strength and wrist strength.    Kinesiotape applied to wrist around TFCC to support ulna and at FCU insertion for skin mobilization and fascia correction  Kinesiotape applied to wrist around TFCC to support ulna and at FCU insertion for skin mobilization and fascia correction.    Wrist extension with relaxed wrist and wall.  Prayer stretch for wrist extension.  Gripper x 3  Pinching tasks    Measurements   HEP: HEP upgrades in bold     Charges: 3 TE      Total Timed Treatment: 40 min  Total Treatment Time: 40 min

## 2024-09-24 ENCOUNTER — APPOINTMENT (OUTPATIENT)
Dept: OCCUPATIONAL MEDICINE | Age: 44
End: 2024-09-24
Attending: FAMILY MEDICINE
Payer: COMMERCIAL

## 2024-09-27 ENCOUNTER — TELEPHONE (OUTPATIENT)
Dept: PHYSICAL THERAPY | Facility: HOSPITAL | Age: 44
End: 2024-09-27

## 2024-10-01 ENCOUNTER — OFFICE VISIT (OUTPATIENT)
Dept: OCCUPATIONAL MEDICINE | Age: 44
End: 2024-10-01
Attending: FAMILY MEDICINE
Payer: COMMERCIAL

## 2024-10-01 PROCEDURE — 97110 THERAPEUTIC EXERCISES: CPT

## 2024-10-01 NOTE — PROGRESS NOTES
Diagnosis:   Left wrist pain (M25.532)       Referring Provider: Chad Fallon  Date of Evaluation:    7/18/2024     Precautions:  Latex Allergy, adhesive tape Next MD visit:   none scheduled  Date of Surgery: n/a   Insurance Primary/Secondary: [employer] / [employer]     # Auth Visits: Progress note signed 8/27 to continue          Discharge Summary  Pt has attended 10 visits in Occupational Therapy.      Subjective: \"It's really good.\"  \"I do regular things at home with no issues like carry a box or do other work.\"    Pain: 0/10, no pain since last session.       Objective:       L Wrist Extension: 60, R wrist extension=58    : L=38, R=44  Lateral: 14  Tripod: 12      Assessment: Pain well controlled with all exercises performed in clinic.  Pt has not experienced pain over the past several weeks with all ADL. Pt demo independence with HEP and tolerates exercises well.  At this time, pt is appropriate for d/c to HEP only.     Goals:  (to be met in 13 visits)  Pt will be independent and compliant with comprehensive HEP to maintain progress achieved in OT: met  Pt will increase wrist extension to be equal to R side for use while pushing self up:met  Pt will increase  to be at least 35 lb for use while opening a jar: met  Pt will report no pain in the wrist during basic self cares: met  Pt will report no more than 1/10 pain when lifting weights as tolerated for leisure activities: met  Will continue to upgrade goals PRN    Post QuickDASH Outcome Score  Post Score: 0 % (10/1/2024  7:57 AM)    20.45 % improvement        Plan: Discharge pt to HEP only.     Patient/Family/Caregiver was advised of these findings, precautions, and treatment options and has agreed to actively participate in planning and for this course of care.    Thank you for your referral. If you have any questions, please contact me at Dept: [phone].    Sincerely,  Electronically signed by therapist: Abi Black OT      Physician's certification required:  Yes  Please co-sign or sign and return this letter via fax as soon as possible to [phone].   I certify the need for these services furnished under this plan of treatment and while under my care.    X___________________________________________________ Date____________________    Certification From: 8/26/2024  To:11/24/2024        Date: 8/2/24                TX#: 5/8 Date: 8/5/24  Tx#: 6/8 Date: 8/20/24  Tx#: 7/8 Date: 8/2624  Tx#: 8/8 9/17/24  Tx: 9 10/1/24  Tx: 10   US 1.2 w/cm2, 2 mhz, 50%, 8 min to volar ulnar wrist and volar forearm.   Tinel's Test (+)  Education on cubital tunnel.    US 1.2 w/cm2, 2 mhz, 50%, 8 min to volar ulnar wrist and volar forearm. US 1.2 w/cm2, 2 mhz, 50%, 8 min to volar ulnar wrist and volar forearm. Reassessment  - /Pinch strength  - Wrist Extension    PROM to wrist extension with Red/Beige Power Web   Wall weight bands  Medium  High row  Low row  Triceps extension  Bicep curl  Each x 20 measurements   IASTM through FCU with focus on FCU muscle belly.   STM through FCU and ulnar side of wrist. Wrist flexion/extension with 2 lb weight x20  Wrist ulnar/radial deviation with 2 lb weight x 20  Supination/pronation with 2 lb weight x 15     Red Power Web  - Full  x 20    Beige Power Web  - Supination  - Pronation Red theraband  Wrist flexion  Wrist extension  Pronation  Supination  Each x 20 yellow Flex bar  -wrist flexion  -wrist extension  -bar bend  -reverse bar bend  -supination  -pronation  -simulated jar open  -simulated jar close  Each x 20    Wrist flexion/extension with 1 lb weight x 20  Wrist ulnar/radial deviation with 1 lb weight x 10  Supination/pronation with 1 lb weight x 10  Supination/pronation with 7 ounce hammer x 10   Black gripper lvl 1 to  blocks:  - D3-D5    - Full     Grasp with D3-D5 cones and reach for elbow extension   Red Power Web  - wrist flexion/extension x 20 both directions  - Supination  with ulnar deviation x 20  - Pronation with radial deviation x 20 [place] Therapy Paw  - Full  x 20  - Pull x 20    Wrist Maze for proprioception and wrist stabilization.    Wrist ROM and in hand manipulation with connect four pieces.   Body blade  45 sec x 4 Gripper black level 2 full  x 25  Level 1 fingertip  x 25   Yellow digiflex full  x 20, individual fingers x 10    Tan Theraputty exercises  - Fully   - Lateral pinch  - Roll and pinch with all fingers  - Tripod grasp and pull  - D3-D5 grasp and pull   Beige Power Web  - Isometric supination/pronation (3/10 pain experienced with ulnar/radial deviation with concentric movement)  - concentric wrist flexion/extension x 20 both directions  - Ulnar/Radial deviation both direction x 10 Pull off velcro blocks with red clothes pin for lateral  strength and wrist strength.    Kinesiotape applied to wrist around TFCC to support ulna and at FCU insertion for skin mobilization and fascia correction  Kinesiotape applied to wrist around TFCC to support ulna and at FCU insertion for skin mobilization and fascia correction.    Wrist extension with relaxed wrist and wall.  Prayer stretch for wrist extension.  Gripper x 3  Pinching tasks    Measurements    HEP: HEP upgrades in bold     Charges: 2 TE     Total Timed Treatment: 30 min  Total Treatment Time: 30 min

## 2024-10-12 ENCOUNTER — OFFICE VISIT (OUTPATIENT)
Dept: FAMILY MEDICINE CLINIC | Facility: CLINIC | Age: 44
End: 2024-10-12
Payer: COMMERCIAL

## 2024-10-12 VITALS
DIASTOLIC BLOOD PRESSURE: 62 MMHG | WEIGHT: 153.81 LBS | BODY MASS INDEX: 32.29 KG/M2 | RESPIRATION RATE: 16 BRPM | HEIGHT: 58 IN | TEMPERATURE: 97 F | SYSTOLIC BLOOD PRESSURE: 112 MMHG | HEART RATE: 75 BPM | OXYGEN SATURATION: 97 %

## 2024-10-12 DIAGNOSIS — M25.532 LEFT WRIST PAIN: ICD-10-CM

## 2024-10-12 DIAGNOSIS — F41.9 ANXIETY: ICD-10-CM

## 2024-10-12 DIAGNOSIS — E66.812 CLASS 2 OBESITY DUE TO EXCESS CALORIES WITHOUT SERIOUS COMORBIDITY WITH BODY MASS INDEX (BMI) OF 35.0 TO 35.9 IN ADULT: ICD-10-CM

## 2024-10-12 DIAGNOSIS — Z00.00 ROUTINE GENERAL MEDICAL EXAMINATION AT A HEALTH CARE FACILITY: Primary | ICD-10-CM

## 2024-10-12 DIAGNOSIS — F33.1 MAJOR DEPRESSIVE DISORDER, RECURRENT, MODERATE (HCC): ICD-10-CM

## 2024-10-12 DIAGNOSIS — E66.09 CLASS 2 OBESITY DUE TO EXCESS CALORIES WITHOUT SERIOUS COMORBIDITY WITH BODY MASS INDEX (BMI) OF 35.0 TO 35.9 IN ADULT: ICD-10-CM

## 2024-10-12 PROCEDURE — 90656 IIV3 VACC NO PRSV 0.5 ML IM: CPT | Performed by: FAMILY MEDICINE

## 2024-10-12 PROCEDURE — 99396 PREV VISIT EST AGE 40-64: CPT | Performed by: FAMILY MEDICINE

## 2024-10-12 PROCEDURE — 90471 IMMUNIZATION ADMIN: CPT | Performed by: FAMILY MEDICINE

## 2024-10-12 NOTE — PATIENT INSTRUCTIONS
Go to [facility] for fasting bloodwork    Continue to work on diet and exercise    Followup in 6 months, sooner if needed

## 2024-10-12 NOTE — PROGRESS NOTES
Lauren Berry is a 44 year old female who is here for   Chief Complaint   Patient presents with    Wellness Visit       HPI:     1. Routine general medical examination at a health care facility  2. Encounter for screening mammogram for malignant neoplasm of breast  3. Screening for endocrine, nutritional, metabolic and immunity disorder  4. Screening for cardiovascular condition  -due for wellness    5. Current moderate episode of major depressive disorder without prior episode (HCC)  6. Anxiety  -better  -setting limits at work    7. Obesity  -working on iet    8. Right breast lump  -was benign up to date on screening    Screening:  Diet: could be better - hoping to make changes  Exercise: recently increased lifting weight and cardio  Sleep: normal - trazodone helps - doesn't need it frequently  Depression/Anxiety: better    Last pap smear: up to date  Last Mammogram: due  Colonoscopy: no known fam hx    Lives with  and daughter    Works in customer service       History   Smoking Status    Never   Smokeless Tobacco    Never         The patient is not currently a tobacco user.  Counseling given: Not Answered      History   Alcohol Use    1.0 standard drink of alcohol/week    1 Standard drinks or equivalent per week     Comment: Socially         History   Drug Use No             Pertinent Fam Hx:    Family History   Problem Relation Age of Onset    Diabetes Maternal Grandmother     Other (Other [Other]) Paternal Grandfather         Liver disease       Social History     Socioeconomic History    Marital status:    Tobacco Use    Smoking status: Never     Passive exposure: Never    Smokeless tobacco: Never   Vaping Use    Vaping status: Never Used   Substance and Sexual Activity    Alcohol use: Yes     Alcohol/week: 1.0 standard drink of alcohol     Types: 1 Standard drinks or equivalent per week     Comment: Socially    Drug use: No    Sexual activity: Yes     Partners: Male     Birth  control/protection: Hysterectomy   Other Topics Concern    Caffeine Concern Yes     Comment: 1 cup of coffee daily    Stress Concern No    Weight Concern Yes     Comment: Not able to lose weight    Special Diet No    Exercise Yes     Comment: 6-7 x weekly    Seat Belt Yes       Wt Readings from Last 6 Encounters:   10/12/24 153 lb 12.8 oz (69.8 kg)   08/13/24 153 lb (69.4 kg)   07/03/24 140 lb 3.2 oz (63.6 kg)   06/20/24 145 lb (65.8 kg)   05/30/24 135 lb (61.2 kg)   05/23/24 135 lb (61.2 kg)       Patient Active Problem List   Diagnosis    Pre-operative cardiovascular examination    Abnormal EKG    Dyspnea on exertion    Morbid obesity (HCC)    Bilateral leg edema    Major depressive disorder, recurrent, moderate (HCC)    Anxiety    Vomiting    Low back pain    Female stress incontinence    Edema of lower extremity    Abdominal pain, epigastric    Nausea with vomiting    Chronic superficial gastritis without bleeding    Diarrhea    Abnormal findings on diagnostic imaging of digestive system    Syncope and collapse       Current Outpatient Medications on File Prior to Visit   Medication Sig Dispense Refill    famotidine (PEPCID) 20 MG Oral Tab Take 1 tablet (20 mg total) by mouth 2 (two) times daily as needed for Heartburn. 180 tablet 0    Multiple Vitamin (MULTI-VITAMIN) Oral Tab Take 1 tablet by mouth daily.       No current facility-administered medications on file prior to visit.         REVIEW OF SYSTEMS:     See HPI for relevant ROS  GENERAL HEALTH: no other complaints  NEURO: no other complaints  VISION: no other complaints  RESPIRATORY: no other complaints  CARDIOVASCULAR: no other complaints  GI: no other complaints  : no other complaints  SKIN: no other complaints  PSYCH: no other complaints  EXT: no other complaints    Wt Readings from Last 6 Encounters:   10/12/24 153 lb 12.8 oz (69.8 kg)   08/13/24 153 lb (69.4 kg)   07/03/24 140 lb 3.2 oz (63.6 kg)   06/20/24 145 lb (65.8 kg)   05/30/24 135 lb (61.2  kg)   24 135 lb (61.2 kg)       Allergies   Allergen Reactions    Shellfish-Derived Products HIVES    Adhesive Tape RASH    Adhesive Tape (Rosins) RASH    Latex RASH       Family History   Problem Relation Age of Onset    Diabetes Maternal Grandmother     Other (Other [Other]) Paternal Grandfather         Liver disease      Past Medical History:    Abdominal pain    Anxiety    Depression    Diarrhea, unspecified    Fatigue    Food intolerance    Irregular bowel habits    Leaking of urine    On going    Nasal congestion    Nausea    Obesity    Uncomfortable fullness after meals    Due to gastric bypass sleeve    Vomiting    Wears glasses      Past Surgical History:   Procedure Laterality Date    Gastric bypass,obesity,sb reconstruc  2018    Hysterectomy  2021    Lap gastric bypass/sara-en-y      Needle biopsy right       benign u/s guided      1999    Total abdom hysterectomy        Social History:    Social History     Socioeconomic History    Marital status:    Tobacco Use    Smoking status: Never     Passive exposure: Never    Smokeless tobacco: Never   Vaping Use    Vaping status: Never Used   Substance and Sexual Activity    Alcohol use: Yes     Alcohol/week: 1.0 standard drink of alcohol     Types: 1 Standard drinks or equivalent per week     Comment: Socially    Drug use: No    Sexual activity: Yes     Partners: Male     Birth control/protection: Hysterectomy   Other Topics Concern    Caffeine Concern Yes     Comment: 1 cup of coffee daily    Stress Concern No    Weight Concern Yes     Comment: Not able to lose weight    Special Diet No    Exercise Yes     Comment: 6-7 x weekly    Seat Belt Yes     Social Drivers of Health     Transportation Needs: No Transportation Needs (7/3/2020)    Received [facility], [facility]    PRAPARE - Transportation     Lack of Transportation (Medical): No     Lack of Transportation (Non-Medical): No   Physical  Activity: Insufficiently Active (12/2/2021)    Received from [facility]    Exercise Vital Sign     Days of Exercise per Week: 2 days     Minutes of Exercise per Session: 30 min   Stress: High Risk (12/2/2021)    Received from [facility]    Stress     Stress is when someone feels tense, nervous, anxious, or can't sleep at night because their mind is troubled. How stressed are you? : Very much           EXAM:   /62 (BP Location: Left arm, Patient Position: Sitting, Cuff Size: adult)   Pulse 75   Temp 97.3 °F (36.3 °C) (Temporal)   Resp 16   Ht 4' 10\" (1.473 m)   Wt 153 lb 12.8 oz (69.8 kg)   LMP 10/19/2021   SpO2 97%   BMI 32.14 kg/m²     GENERAL: A&O, in no apparent distress  HEENT: atraumatic, MMM, throat is clear  EYES: PERRLA, EOMI  NECK: supple, no thyromegaly  CHEST: no tenderness  LUNGS: clear to auscultation bilateraly, no c/w/r  CARDIO: RRR without murmurs  NEURO: CN II-XII grossly intact  PSYCH: pleasant  MUSCULOSKELETAL: normal gait, no appreciable defects  EXTREMITIES: no cyanosis, clubbing or edema  SKIN: no rashes,no suspicious lesions    Problem focused exam (for problems outside of physical, if any):    The ASCVD Risk score (Jessica ANGELO, et al., 2019) failed to calculate for the following reasons:    Cannot find a previous HDL lab    Cannot find a previous total cholesterol lab    ASSESSMENT AND PLAN:     Health Maintenance  -We discussed the following:  Healthy diet and exercise, cancer screening, self breast exams and calcium and vitamin D supplementation.    -Fasting labs ordered    Stress Management: counseled    1. Routine general medical examination at a health care facility  2. Encounter for screening mammogram for malignant neoplasm of breast  -up to date on screening  -PAP - s/p KRISTA  -Colonoscopy - due 2029  -Mammogram due in Aug 2025  -labs pending    3. Screening for endocrine, nutritional, metabolic and  immunity disorder  - CBC WITH DIFFERENTIAL WITH PLATELET  - COMP METABOLIC PANEL (14)  - TSH W REFLEX TO FREE T4    4. Screening for cardiovascular condition  - LIPID PANEL    5. Current moderate episode of major depressive disorder without prior episode (HCC)  6. Anxiety  -doing well, off meds  -setting limits at work has been really helpful    7. Obesity  -c/w lifestyle changes    8. Right breast lump  -up to date with breast ca screening        Orders This Visit:  Orders Placed This Encounter   Procedures    CBC With Differential With Platelet    Comp Metabolic Panel (14)    Lipid Panel    TSH W Reflex To Free T4    Fluzone trivalent vaccine, PF 0.5mL, 6mo+ (87230)       Meds This Visit:  Requested Prescriptions      No prescriptions requested or ordered in this encounter       Imaging & Referrals:  INFLUENZA VACCINE, TRI, PRESERV FREE, 0.5 ML       The patient indicates understanding of these issues and agrees to the plan.  The patient is asked to return in 6 months.  PATRICK PATEL MD

## 2024-11-10 LAB
ABSOLUTE BASOPHILS: 40 CELLS/UL (ref 0–200)
ABSOLUTE EOSINOPHILS: 30 CELLS/UL (ref 15–500)
ABSOLUTE LYMPHOCYTES: 2025 CELLS/UL (ref 850–3900)
ABSOLUTE MONOCYTES: 500 CELLS/UL (ref 200–950)
ABSOLUTE NEUTROPHILS: 2405 CELLS/UL (ref 1500–7800)
ALBUMIN/GLOBULIN RATIO: 1.6 (CALC) (ref 1–2.5)
ALBUMIN: 4.3 G/DL (ref 3.6–5.1)
ALKALINE PHOSPHATASE: 54 U/L (ref 31–125)
ALT: 11 U/L (ref 6–29)
AST: 14 U/L (ref 10–30)
BASOPHILS: 0.8 %
BILIRUBIN, TOTAL: 0.7 MG/DL (ref 0.2–1.2)
BUN: 15 MG/DL (ref 7–25)
CALCIUM: 9 MG/DL (ref 8.6–10.2)
CARBON DIOXIDE: 28 MMOL/L (ref 20–32)
CHLORIDE: 106 MMOL/L (ref 98–110)
CHOL/HDLC RATIO: 2.8 (CALC)
CHOLESTEROL, TOTAL: 207 MG/DL
CREATININE: 0.68 MG/DL (ref 0.5–0.99)
EGFR: 111 ML/MIN/1.73M2
EOSINOPHILS: 0.6 %
GLOBULIN: 2.7 G/DL (CALC) (ref 1.9–3.7)
GLUCOSE: 82 MG/DL (ref 65–99)
HDL CHOLESTEROL: 75 MG/DL
HEMATOCRIT: 42.2 % (ref 35–45)
HEMOGLOBIN: 13.6 G/DL (ref 11.7–15.5)
LDL-CHOLESTEROL: 116 MG/DL (CALC)
LYMPHOCYTES: 40.5 %
MCH: 30.8 PG (ref 27–33)
MCHC: 32.2 G/DL (ref 32–36)
MCV: 95.5 FL (ref 80–100)
MONOCYTES: 10 %
MPV: 10.1 FL (ref 7.5–12.5)
NEUTROPHILS: 48.1 %
NON-HDL CHOLESTEROL: 132 MG/DL (CALC)
PLATELET COUNT: 320 THOUSAND/UL (ref 140–400)
POTASSIUM: 4.2 MMOL/L (ref 3.5–5.3)
PROTEIN, TOTAL: 7 G/DL (ref 6.1–8.1)
RDW: 12.6 % (ref 11–15)
RED BLOOD CELL COUNT: 4.42 MILLION/UL (ref 3.8–5.1)
SODIUM: 139 MMOL/L (ref 135–146)
TRIGLYCERIDES: 68 MG/DL
TSH W/REFLEX TO FT4: 2.3 MIU/L
WHITE BLOOD CELL COUNT: 5 THOUSAND/UL (ref 3.8–10.8)

## 2025-01-06 NOTE — TELEPHONE ENCOUNTER
Patient returning Georgette's phone call, please contact her on her home phone number [phone]   Patient signed HIPAA medical records authorization form for the Facility identified below to disclose patient health information to Sor[address]:     [facility]  Address: [address] [place] [place], [address]  Phone: [phone]

## 2025-01-10 ENCOUNTER — OFFICE VISIT (OUTPATIENT)
Dept: OBGYN | Age: 45
End: 2025-01-10

## 2025-01-10 VITALS
HEART RATE: 69 BPM | WEIGHT: 165 LBS | OXYGEN SATURATION: 98 % | SYSTOLIC BLOOD PRESSURE: 116 MMHG | TEMPERATURE: 98.4 F | BODY MASS INDEX: 33.26 KG/M2 | DIASTOLIC BLOOD PRESSURE: 71 MMHG | RESPIRATION RATE: 16 BRPM | HEIGHT: 59 IN

## 2025-01-10 DIAGNOSIS — N39.46 MIXED STRESS AND URGE URINARY INCONTINENCE: ICD-10-CM

## 2025-01-10 DIAGNOSIS — Z01.419 WELL WOMAN EXAM WITH ROUTINE GYNECOLOGICAL EXAM: Primary | ICD-10-CM

## 2025-01-10 DIAGNOSIS — Z12.31 ENCOUNTER FOR SCREENING MAMMOGRAM FOR MALIGNANT NEOPLASM OF BREAST: ICD-10-CM

## 2025-01-10 SDOH — HEALTH STABILITY: PHYSICAL HEALTH: ON AVERAGE, HOW MANY MINUTES DO YOU ENGAGE IN EXERCISE AT THIS LEVEL?: 60 MIN

## 2025-01-10 SDOH — HEALTH STABILITY: MENTAL HEALTH
STRESS IS WHEN SOMEONE FEELS TENSE, NERVOUS, ANXIOUS, OR CAN'T SLEEP AT NIGHT BECAUSE THEIR MIND IS TROUBLED. HOW STRESSED ARE YOU?: NOT AT ALL

## 2025-01-10 SDOH — SOCIAL STABILITY: SOCIAL INSECURITY: HOW OFTEN DOES ANYONE, INCLUDING FAMILY AND FRIENDS, INSULT OR TALK DOWN TO YOU?: NEVER

## 2025-01-10 SDOH — SOCIAL STABILITY: SOCIAL INSECURITY: HOW OFTEN DOES ANYONE, INCLUDING FAMILY AND FRIENDS, SCREAM OR CURSE AT YOU?: NEVER

## 2025-01-10 SDOH — SOCIAL STABILITY: SOCIAL INSECURITY: HOW OFTEN DOES ANYONE, INCLUDING FAMILY AND FRIENDS, PHYSICALLY HURT YOU?: NEVER

## 2025-01-10 SDOH — SOCIAL STABILITY: SOCIAL INSECURITY: HOW OFTEN DOES ANYONE, INCLUDING FAMILY AND FRIENDS, THREATEN YOU WITH HARM?: NEVER

## 2025-01-10 SDOH — HEALTH STABILITY: PHYSICAL HEALTH: ON AVERAGE, HOW MANY DAYS PER WEEK DO YOU ENGAGE IN MODERATE TO STRENUOUS EXERCISE (LIKE A BRISK WALK)?: 3 DAYS

## 2025-01-10 ASSESSMENT — PATIENT HEALTH QUESTIONNAIRE - PHQ9
CLINICAL INTERPRETATION OF PHQ2 SCORE: NO FURTHER SCREENING NEEDED
2. FEELING DOWN, DEPRESSED OR HOPELESS: NOT AT ALL
1. LITTLE INTEREST OR PLEASURE IN DOING THINGS: NOT AT ALL
SUM OF ALL RESPONSES TO PHQ9 QUESTIONS 1 AND 2: 0
SUM OF ALL RESPONSES TO PHQ9 QUESTIONS 1 AND 2: 0

## 2025-01-11 LAB — BACTERIA UR CULT: NORMAL

## 2025-04-24 NOTE — PROGRESS NOTES
Lauren Baldwin is a 44 year old female here for   Chief Complaint   Patient presents with    Hip Pain     Right hip for 4 weeks        HPI:       1. Right hip pain  -started 4 wks ago  -in right hip  -radiates to groin  -worse with acitivyt - better with rest  -also has to lay on opposide side  -tylenol and ibuprofen help minimally    2. Major depressive disorder, recurrent, moderate (HCC)  3. Anxiety  -mood stable off meds          HISTORY:  Past Medical History[1]   Past Surgical History[2]   Family History[3]   Social History: Short Social Hx on File[4]     Medications (Active prior to today's visit):  Current Medications[5]    Allergies:  Allergies[6]      ROS:   See HPI for relevant ROS    --GEN: No other complaints  --HEENT: No other complaints  --RESP: No other complaints  --CV: No other complaints  --GI: No other complaints  --MSK: No other complaints    All other systems reviewed are negative    PHYSICAL EXAM:   /62 (BP Location: Left arm, Patient Position: Sitting, Cuff Size: adult)   Pulse 70   Temp 97.3 °F (36.3 °C) (Temporal)   Resp 16   Ht 4' 10\" (1.473 m)   Wt 167 lb 12.8 oz (76.1 kg)   LMP 10/19/2021   SpO2 98%   BMI 35.07 kg/m²     Gen: NAD  HEENT: NCAT, pupils equal and round  Pulm: CTAB, no wheezing  CV: RRR  Ext: full ROM; pain worse on internal rotation of hip  Psych: normal affect     ASSESSMENT/PLAN:     1. Right hip pain  -suspect strain  -start meloxicam daily x 4 days, then prn  -stretching per handout  -heat/ice prn  -check xray if not improving  -consider PT    - XR HIP + PELVIS MIN 4 VIEWS RIGHT (CPT=73503); Future  - OP REFERRAL TO [facility]    2. Major depressive disorder, recurrent, moderate (HCC)  3. Anxiety  -stable, off meds  -will continue to monitor           Chronic Conditions:    No problem-specific Assessment & Plan notes found for this encounter.       Health Maintenance:  Health Maintenance   Topic Date Due    COVID-19  Vaccine (2024- season) 2024    Annual Depression Screening  2025    Mammogram  2025    Annual Physical  10/12/2025    Colorectal Cancer Screening  04/15/2029    DTaP,Tdap,and Td Vaccines (4 - Td or Tdap) 2031    Influenza Vaccine  Completed    Pneumococcal Vaccine: Birth to 50yrs  Aged Out    Meningococcal B Vaccine  Aged Out               The patient is asked to return in 3months.    Orders This Visit:  No orders of the defined types were placed in this encounter.      Meds This Visit:  Requested Prescriptions      No prescriptions requested or ordered in this encounter       Imaging & Referrals:  None     PATRICK PATEL MD    I spent a total of 30 minutes, more than half of which was spent counseling/coordinating care regarding hip         [1]   Past Medical History:   Abdominal pain    Anxiety    Depression    Diarrhea, unspecified    Fatigue    Food intolerance    Irregular bowel habits    Leaking of urine    On going    Nasal congestion    Nausea    Obesity    Uncomfortable fullness after meals    Due to gastric bypass sleeve    Vomiting    Wears glasses   [2]   Past Surgical History:  Procedure Laterality Date    Gastric bypass,obesity,sb reconstruc  2018    Hysterectomy  2021    Lap gastric bypass/sara-en-y      Needle biopsy right       benign u/s guided      1999    Total abdom hysterectomy     [3]   Family History  Problem Relation Age of Onset    Diabetes Maternal Grandmother     Other (Other [Other]) Paternal Grandfather         Liver disease   [4]   Social History  Socioeconomic History    Marital status:    Tobacco Use    Smoking status: Never     Passive exposure: Never    Smokeless tobacco: Never   Vaping Use    Vaping status: Never Used   Substance and Sexual Activity    Alcohol use: Not Currently     Alcohol/week: 1.0 standard drink of alcohol     Types: 1 Standard drinks or equivalent per week     Comment: Socially    Drug use: No     Sexual activity: Yes     Partners: Male     Birth control/protection: Hysterectomy   Other Topics Concern    Caffeine Concern Yes     Comment: 1 cup of coffee daily    Stress Concern No    Weight Concern Yes     Comment: Not able to lose weight    Special Diet No    Exercise Yes     Comment: 6-7 x weekly    Seat Belt Yes     Social Drivers of Health     Transportation Needs: No Transportation Needs (7/3/2020)    Received from [facility]    PRAPARE - Transportation     Lack of Transportation (Medical): No     Lack of Transportation (Non-Medical): No   Stress: Low Risk  (1/10/2025)    Received from [facility]    Stress     Stress is when someone feels tense, nervous, anxious, or can't sleep at night because their mind is troubled. How stressed are you? : Not at all   [5]   Current Outpatient Medications   Medication Sig Dispense Refill    methocarbamol 750 MG Oral Tab Take 1 tablet (750 mg total) by mouth daily.      famotidine (PEPCID) 20 MG Oral Tab Take 1 tablet (20 mg total) by mouth 2 (two) times daily as needed for Heartburn. 180 tablet 0    Multiple Vitamin (MULTI-VITAMIN) Oral Tab Take 1 tablet by mouth daily.     [6]   Allergies  Allergen Reactions    Shellfish-Derived Products HIVES    Adhesive Tape RASH    Adhesive Tape (Rosins) RASH    Latex RASH

## 2025-04-24 NOTE — PATIENT INSTRUCTIONS
Start home exercises    Start meloxicam daily with food for 4 days, then as needed    Heat/ice as needed    Consider PT if not improving    Consider xray if still not better    Followup in 2 months if not resovled

## 2025-08-30 ENCOUNTER — HOSPITAL ENCOUNTER (OUTPATIENT)
Age: 45
End: 2025-08-30
Attending: STUDENT IN AN ORGANIZED HEALTH CARE EDUCATION/TRAINING PROGRAM

## 2025-09-02 ENCOUNTER — RESULTS FOLLOW-UP (OUTPATIENT)
Dept: OBGYN | Age: 45
End: 2025-09-02

## 2025-09-02 DIAGNOSIS — N63.10 MASS OF RIGHT BREAST, UNSPECIFIED QUADRANT: Primary | ICD-10-CM

## (undated) DEVICE — Device

## (undated) NOTE — LETTER
Date: 5/6/2020    Patient Name: Radha Blackwood          To Whom it may concern: The above patient was evaluated at the [facility] for treatment of a medical condition. She tested positive for COVID on 5/6/20.     This patient should be e

## (undated) NOTE — MR AVS SNAPSHOT
[facility]  [phone]               Thank you for choosing us for your health care visit with August Bullock MD.  We are glad to serve you and happy to provide you with this ayala visit,  view other health information, and more. To sign up or find more information, go to [email or web address] and click on the Sign Up Now link in the [employer] box.      Enter your BOOK [employer] TIGER Activation Code exactly as it appears below along with yo

## (undated) NOTE — LETTER
Date: 7/26/2021    Patient Name: Ronnie Uriarte          To Whom it may concern: The above patient was seen at the [facility] for treatment of a medical condition.     This patient should be excused from attending work from 7/26/21 throug

## (undated) NOTE — LETTER
03/04/20      Dear Marta Stallings,       In our system Dr. Terrence Tipton is shown to be your primary care provider, you are due for an Annual Wellness Visit. Can you please call our office to schedule appointment.      If you are being evaluated by another

## (undated) NOTE — MR AVS SNAPSHOT
[facility]  [phone]               Thank you for choosing us for your health care visit with Guilherme Mendez MD.  We are glad to serve you and happy to provide you with this ayala If you have questions, you can call [phone] to talk to our [facility] Staff. Remember, [employer] is NOT to be used for urgent needs. For medical emergencies, dial 911.            Visit [facility] online at  [email or web address]

## (undated) NOTE — LETTER
Date: 5/26/2020    Patient Name: Gomez Thayer          To Whom it may concern: This letter has been written at the patient's request. The above patient was seen at the [facility] for treatment of a medical condition.       The patient m

## (undated) NOTE — LETTER
[address] [place] [id number]  [phone]          Patient: Gomez Thayer   YOB: 1980   Date of Visit: 5/8/2020       Dear Employer,         May 8, 2020    At [facility], it is at all feasible for them to do so. COVID-19 is especially risky for high-risk patients (including, but not limited to, age over 61, immunosuppressed status due to disease or medication, chronic respiratory or heart conditions and diabetes).     · Cesar Jaramillo

## (undated) NOTE — LETTER
Date: 3/7/2018    Patient Name: Estephania Solano          To Whom it may concern: The above patient was seen at the [facility] for treatment of a medical condition.     This patient should be excused from attending work from 03/02/2018 thro

## (undated) NOTE — LETTER
Date: 5/19/2020    Patient Name: Andreea Marie        To Whom it may concern: The above patient was evaluated at the [facility] for treatment of a medical condition.     This patient should be allowed to work from home until her cough

## (undated) NOTE — LETTER
09/17/21        [address] [place] [address] [address]Rjat Romo [phone]      Dear Araceli Yusuf,    Our records indicate that you have outstanding lab work and or testing that was ordered for you and has not yet been completed:  Orders Placed This Encoun

## (undated) NOTE — Clinical Note
Date: 1/23/2017    Patient Name: Donovan Damon          To Whom it may concern: This letter has been written at the patient's request. The above patient was seen at the [facility] for treatment of a medical condition.     This patient missyu